# Patient Record
Sex: FEMALE | Race: WHITE | Employment: FULL TIME | URBAN - METROPOLITAN AREA
[De-identification: names, ages, dates, MRNs, and addresses within clinical notes are randomized per-mention and may not be internally consistent; named-entity substitution may affect disease eponyms.]

---

## 2017-06-13 ENCOUNTER — OFFICE VISIT (OUTPATIENT)
Dept: FAMILY MEDICINE CLINIC | Age: 47
End: 2017-06-13
Payer: COMMERCIAL

## 2017-06-13 VITALS
SYSTOLIC BLOOD PRESSURE: 130 MMHG | DIASTOLIC BLOOD PRESSURE: 80 MMHG | BODY MASS INDEX: 41.95 KG/M2 | HEIGHT: 70 IN | WEIGHT: 293 LBS

## 2017-06-13 DIAGNOSIS — B35.2 TINEA MANUUM: Primary | ICD-10-CM

## 2017-06-13 DIAGNOSIS — B35.3 TINEA PEDIS OF BOTH FEET: ICD-10-CM

## 2017-06-13 PROCEDURE — 99213 OFFICE O/P EST LOW 20 MIN: CPT | Performed by: FAMILY MEDICINE

## 2017-06-13 RX ORDER — TERBINAFINE HYDROCHLORIDE 250 MG/1
250 TABLET ORAL DAILY
Qty: 14 TABLET | Refills: 0 | Status: SHIPPED | OUTPATIENT
Start: 2017-06-13 | End: 2017-06-27

## 2017-06-13 ASSESSMENT — PATIENT HEALTH QUESTIONNAIRE - PHQ9
2. FEELING DOWN, DEPRESSED OR HOPELESS: 0
SUM OF ALL RESPONSES TO PHQ QUESTIONS 1-9: 0
SUM OF ALL RESPONSES TO PHQ9 QUESTIONS 1 & 2: 0
1. LITTLE INTEREST OR PLEASURE IN DOING THINGS: 0

## 2018-08-16 ENCOUNTER — OFFICE VISIT (OUTPATIENT)
Dept: FAMILY MEDICINE CLINIC | Age: 48
End: 2018-08-16
Payer: COMMERCIAL

## 2018-08-16 VITALS
HEART RATE: 71 BPM | HEIGHT: 70 IN | RESPIRATION RATE: 18 BRPM | DIASTOLIC BLOOD PRESSURE: 84 MMHG | BODY MASS INDEX: 41.95 KG/M2 | WEIGHT: 293 LBS | SYSTOLIC BLOOD PRESSURE: 130 MMHG | OXYGEN SATURATION: 97 %

## 2018-08-16 DIAGNOSIS — Z13.1 DIABETES MELLITUS SCREENING: ICD-10-CM

## 2018-08-16 DIAGNOSIS — B07.8 OTHER VIRAL WARTS: Primary | ICD-10-CM

## 2018-08-16 DIAGNOSIS — L40.9 PSORIASIS: ICD-10-CM

## 2018-08-16 DIAGNOSIS — E78.00 HYPERCHOLESTEROLEMIA: ICD-10-CM

## 2018-08-16 DIAGNOSIS — Z23 NEED FOR STREPTOCOCCUS PNEUMONIAE VACCINATION: ICD-10-CM

## 2018-08-16 PROCEDURE — 90732 PPSV23 VACC 2 YRS+ SUBQ/IM: CPT | Performed by: INTERNAL MEDICINE

## 2018-08-16 PROCEDURE — 90471 IMMUNIZATION ADMIN: CPT | Performed by: INTERNAL MEDICINE

## 2018-08-16 PROCEDURE — 99213 OFFICE O/P EST LOW 20 MIN: CPT | Performed by: INTERNAL MEDICINE

## 2018-08-16 RX ORDER — TRIAMCINOLONE ACETONIDE 1 MG/G
CREAM TOPICAL
Qty: 45 G | Refills: 0 | Status: SHIPPED | OUTPATIENT
Start: 2018-08-16

## 2018-08-16 ASSESSMENT — PATIENT HEALTH QUESTIONNAIRE - PHQ9
SUM OF ALL RESPONSES TO PHQ QUESTIONS 1-9: 0
1. LITTLE INTEREST OR PLEASURE IN DOING THINGS: 0
SUM OF ALL RESPONSES TO PHQ QUESTIONS 1-9: 0
2. FEELING DOWN, DEPRESSED OR HOPELESS: 0
SUM OF ALL RESPONSES TO PHQ9 QUESTIONS 1 & 2: 0

## 2018-08-16 ASSESSMENT — ENCOUNTER SYMPTOMS
COUGH: 0
SORE THROAT: 0
ABDOMINAL PAIN: 0
BLURRED VISION: 0
SHORTNESS OF BREATH: 0
HEARTBURN: 0
NAUSEA: 0

## 2018-08-16 NOTE — PROGRESS NOTES
After obtaining consent, and per orders of Dr. Zoila Soliz, injection of Pneumovax 23 given in Left deltoid by Kevin Peters. Patient instructed to remain in clinic for 20 minutes afterwards, and to report any adverse reaction to me immediately.

## 2018-08-16 NOTE — PROGRESS NOTES
HPI Notes    Name: Mariam Arnold  : 1970         Chief Complaint:     Chief Complaint   Patient presents with    Lesion(s)     on left hand and her knuckles on right5 hand       History of Present Illness:        Berenice Dugan presents to the office for evaluation of skin lesions on the right hand and forearm. She also complains of rash on both hands over the all knuckles. states the rash is itchy, her skin is peeling and sometimes bleeds   She tried to apply OTC cream for psoriasis. It didn't help. Reports history of psoriasis. States the lesions on the right hand and forearm do not resemble psoriasis rash. She believes it's most likely warts. The lesions are itchy, nonbleeding. She has no pain. She reports that she is getting new lesions            Past Medical History:     No past medical history on file. Reviewed all health maintenance requirements and ordered appropriate tests  Health Maintenance Due   Topic Date Due    HIV screen  1986    DTaP/Tdap/Td vaccine (1 - Tdap) 1990    Diabetes screen  2017    Cervical cancer screen  2017       Past Surgical History:     Past Surgical History:   Procedure Laterality Date    TUBAL LIGATION          Medications:       Prior to Admission medications    Medication Sig Start Date End Date Taking? Authorizing Provider   triamcinolone (KENALOG) 0.1 % cream Apply topically 2 times daily. 18  Yes Deepa Sen MD   acetaminophen (TYLENOL) 325 MG tablet Take 650 mg by mouth every 6 hours as needed. Yes Historical Provider, MD        Allergies: Other    Social History:     Tobacco:    reports that she quit smoking about 7 months ago. Her smoking use included Cigarettes. She has a 7.50 pack-year smoking history. She has never used smokeless tobacco.  Alcohol:      reports that she does not drink alcohol. Drug Use:  reports that she does not use drugs. Family History:     No family history on file.     Review of Systems:         Review of Systems   Constitutional: Negative for chills, fever and weight loss. HENT: Negative for congestion and sore throat. Eyes: Negative for blurred vision. Respiratory: Negative for cough and shortness of breath. Cardiovascular: Negative for chest pain and palpitations. Gastrointestinal: Negative for abdominal pain, heartburn and nausea. Genitourinary: Negative for dysuria. Skin: Positive for itching and rash. Neurological: Negative for dizziness and headaches. Psychiatric/Behavioral: Negative for depression and memory loss. The patient is not nervous/anxious. Physical Exam:     Vitals:  /84 (Site: Left Arm, Position: Sitting, Cuff Size: Large Adult)   Pulse 71   Resp 18   Ht 5' 10\" (1.778 m)   Wt (!) 348 lb (157.9 kg)   LMP 08/02/2018   SpO2 97%   BMI 49.93 kg/m²       Physical Exam   Constitutional: She is oriented to person, place, and time. She appears well-developed and well-nourished. No distress. HENT:   Head: Normocephalic and atraumatic. Right Ear: External ear normal.   Left Ear: External ear normal.   Mouth/Throat: Oropharynx is clear and moist. No oropharyngeal exudate. Eyes: Conjunctivae are normal. Right eye exhibits no discharge. Left eye exhibits no discharge. No scleral icterus. Neck: No thyromegaly present. Cardiovascular: Normal rate, regular rhythm, normal heart sounds and intact distal pulses. No murmur heard. Pulmonary/Chest: Effort normal and breath sounds normal. She has no wheezes. She has no rales. Abdominal: Soft. Bowel sounds are normal. She exhibits no distension and no mass. There is no tenderness. Musculoskeletal: Normal range of motion. She exhibits no edema or tenderness. Lymphadenopathy:     She has no cervical adenopathy. Neurological: She is alert and oriented to person, place, and time. No cranial nerve deficit. Coordination normal.   Skin: Skin is warm and dry.  Rash (overthe knuckles in both hands she has read, dry, scaly rash, no exudate) noted. There are 3 small wart-like lesions on the right hand and right forearm   Psychiatric: She has a normal mood and affect. Her behavior is normal. Judgment normal.   Vitals reviewed. Data:     Lab Results   Component Value Date     11/10/2014    K 4.1 11/10/2014     11/10/2014    CO2 26 11/10/2014    BUN 15 11/10/2014    CREATININE 1.01 11/10/2014    GLUCOSE 94 11/10/2014    LABALBU 3.9 01/08/2013    BILITOT 0.54 01/08/2013    ALKPHOS 66 01/08/2013    AST 18 01/08/2013    ALT 24 01/08/2013     Lab Results   Component Value Date    WBC 9.1 01/08/2013    RBC 4.72 01/08/2013    HGB 12.9 01/08/2013    HCT 38.1 01/08/2013    MCV 80.7 01/08/2013    MCH 27.3 01/08/2013    MCHC 33.8 01/08/2013    RDW 14.4 01/08/2013     01/08/2013    MPV NOT REPORTED 01/08/2013     Lab Results   Component Value Date    TSH 0.97 11/10/2014     Lab Results   Component Value Date    CHOL 242 05/16/2014    HDL 42 05/16/2014    LABA1C 5.4 05/16/2014          Assessment & Plan        Diagnosis Orders   1. Other viral warts   We'll refer to dermatology for evaluation of warts and psoriasis External Referral To Dermatology   2. Psoriasis   Ordered topical steroids. Follow-up with dermatology triamcinolone (KENALOG) 0.1 % cream    External Referral To Dermatology   3. Hypercholesterolemia  She has a history of elevated cholesterol in the past, not on any medications. We'll recheck lipid panel  Lipid Panel   4. Diabetes mellitus screening  Basic Metabolic Panel   5. Need for Streptococcus pneumoniae vaccination   She received Pneumovax in our office today Pneumococcal polysaccharide vaccine 23-valent greater than or equal to 1yo subcutaneous/IM                   Completed Refills   Requested Prescriptions     Signed Prescriptions Disp Refills    triamcinolone (KENALOG) 0.1 % cream 45 g 0     Sig: Apply topically 2 times daily.      Return in about 3 months

## 2018-09-10 ENCOUNTER — HOSPITAL ENCOUNTER (OUTPATIENT)
Age: 48
Discharge: HOME | End: 2018-09-10
Payer: COMMERCIAL

## 2018-09-10 DIAGNOSIS — Z13.1 DIABETES MELLITUS SCREENING: ICD-10-CM

## 2018-09-10 DIAGNOSIS — E78.00 HYPERCHOLESTEROLEMIA: ICD-10-CM

## 2018-09-10 LAB
ANION GAP SERPL CALCULATED.3IONS-SCNC: 10 MMOL/L (ref 9–17)
BUN BLDV-MCNC: 13 MG/DL (ref 6–20)
BUN/CREAT BLD: 16 (ref 9–20)
CALCIUM SERPL-MCNC: 9.3 MG/DL (ref 8.6–10.4)
CHLORIDE BLD-SCNC: 103 MMOL/L (ref 98–107)
CHOLESTEROL/HDL RATIO: 4.7
CHOLESTEROL: 198 MG/DL
CO2: 27 MMOL/L (ref 20–31)
CREAT SERPL-MCNC: 0.82 MG/DL (ref 0.5–0.9)
GFR AFRICAN AMERICAN: >60 ML/MIN
GFR NON-AFRICAN AMERICAN: >60 ML/MIN
GFR SERPL CREATININE-BSD FRML MDRD: NORMAL ML/MIN/{1.73_M2}
GFR SERPL CREATININE-BSD FRML MDRD: NORMAL ML/MIN/{1.73_M2}
GLUCOSE BLD-MCNC: 93 MG/DL (ref 70–99)
HDLC SERPL-MCNC: 42 MG/DL
LDL CHOLESTEROL: 122 MG/DL (ref 0–130)
PATIENT FASTING?: YES
POTASSIUM SERPL-SCNC: 4.2 MMOL/L (ref 3.7–5.3)
SODIUM BLD-SCNC: 140 MMOL/L (ref 135–144)
TRIGL SERPL-MCNC: 171 MG/DL
VLDLC SERPL CALC-MCNC: ABNORMAL MG/DL (ref 1–30)

## 2018-09-10 PROCEDURE — 80061 LIPID PANEL: CPT

## 2018-09-10 PROCEDURE — 80048 BASIC METABOLIC PNL TOTAL CA: CPT

## 2018-09-10 PROCEDURE — 36415 COLL VENOUS BLD VENIPUNCTURE: CPT

## 2020-01-03 ENCOUNTER — OFFICE VISIT (OUTPATIENT)
Dept: PRIMARY CARE CLINIC | Age: 50
End: 2020-01-03
Payer: COMMERCIAL

## 2020-01-03 VITALS
WEIGHT: 293 LBS | SYSTOLIC BLOOD PRESSURE: 130 MMHG | OXYGEN SATURATION: 96 % | DIASTOLIC BLOOD PRESSURE: 86 MMHG | BODY MASS INDEX: 48.64 KG/M2 | HEART RATE: 69 BPM | TEMPERATURE: 98 F

## 2020-01-03 PROCEDURE — 99213 OFFICE O/P EST LOW 20 MIN: CPT | Performed by: NURSE PRACTITIONER

## 2020-01-03 PROCEDURE — 87804 INFLUENZA ASSAY W/OPTIC: CPT | Performed by: NURSE PRACTITIONER

## 2020-01-03 RX ORDER — ALBUTEROL SULFATE 90 UG/1
2 AEROSOL, METERED RESPIRATORY (INHALATION) EVERY 4 HOURS PRN
Qty: 2 INHALER | Refills: 0 | Status: SHIPPED | OUTPATIENT
Start: 2020-01-03

## 2020-01-03 RX ORDER — PREDNISONE 20 MG/1
40 TABLET ORAL DAILY
Qty: 10 TABLET | Refills: 0 | Status: SHIPPED | OUTPATIENT
Start: 2020-01-03 | End: 2020-01-08

## 2020-01-03 RX ORDER — AMOXICILLIN 500 MG/1
500 CAPSULE ORAL 3 TIMES DAILY
Qty: 30 CAPSULE | Refills: 0 | Status: SHIPPED | OUTPATIENT
Start: 2020-01-03 | End: 2020-01-10

## 2020-01-03 ASSESSMENT — ENCOUNTER SYMPTOMS
SHORTNESS OF BREATH: 1
EYES NEGATIVE: 1
GASTROINTESTINAL NEGATIVE: 1
SORE THROAT: 1
WHEEZING: 1
COUGH: 1

## 2020-01-03 NOTE — PROGRESS NOTES
Respiratory: Positive for cough, shortness of breath and wheezing. Cardiovascular: Negative. Gastrointestinal: Negative. Genitourinary: Negative. Skin: Negative. Neurological: Negative. Objective:     Physical Exam  Constitutional:       General: She is not in acute distress. Appearance: She is obese. She is ill-appearing. She is not toxic-appearing or diaphoretic. HENT:      Head: Normocephalic. Comments: Right TM hyperemic with effusion      Left Ear: Tympanic membrane, ear canal and external ear normal.      Nose: Congestion present. Mouth/Throat:      Mouth: Mucous membranes are moist.      Pharynx: Posterior oropharyngeal erythema present. Eyes:      General:         Right eye: No discharge. Left eye: No discharge. Neck:      Musculoskeletal: Normal range of motion. Cardiovascular:      Rate and Rhythm: Normal rate and regular rhythm. Pulmonary:      Effort: Pulmonary effort is normal. No respiratory distress. Breath sounds: No stridor, decreased air movement or transmitted upper airway sounds. Examination of the right-upper field reveals wheezing. Examination of the left-upper field reveals wheezing. Examination of the right-middle field reveals wheezing. Examination of the left-middle field reveals wheezing. Wheezing present. No decreased breath sounds, rhonchi or rales. Comments: Occasional dry cough in office, speaks full sentences without distress   Skin:     General: Skin is warm. Capillary Refill: Capillary refill takes less than 2 seconds. Neurological:      Mental Status: She is alert. Psychiatric:         Behavior: Behavior is cooperative. /86 (Site: Right Upper Arm, Position: Sitting, Cuff Size: Large Adult)   Pulse 69   Temp 98 °F (36.7 °C) (Oral)   Wt (!) 339 lb (153.8 kg)   SpO2 96%   BMI 48.64 kg/m²     Assessment:      Diagnosis Orders   1.  Bronchitis  POCT Influenza A/B   2. Other non-recurrent acute nonsuppurative otitis media of right ear         Plan:   1. Bronchitis. Will treat with steroid burst.  Informed to not take NSAIDs with this medication. Informed to take steroids with food. Will give albuterol inhaler 2 puffs every 4-6 hours as needed for cough, wheeze. Discussed with patient if any severe symptoms such as chest pain, difficulty breathing or spiking fever go to the emergency room for evaluation. 2.  Otitis media. Will treat with amoxicillin. Discussed taking dose appropriate Tylenol as needed for discomfort. If symptoms persist or worsen return to clinic for evaluation. Patient son is positive for influenza B. Patient declines Tamiflu today in office. Discussed exam, POCT findings, plan of care (including prescriptive and supportive as listed below) and follow-up atlength with  Patient. Reviewed all prescribed and recommended medications, administration and side effects. Encouraged to return to [address] [place] [address] for noimprovement and or worsening of symptoms. To ER or call 911 if any difficulty breathing, shortness of breath, inability to swallow, hives or temp greater than 103 degrees. Questions answered. They verbalized understandingand agreement. No follow-ups on file. Orders Placed This Encounter   Medications    amoxicillin (AMOXIL) 500 MG capsule     Sig: Take 1 capsule by mouth 3 times daily for 7 days     Dispense:  30 capsule     Refill:  0    albuterol sulfate HFA (PROVENTIL HFA) 108 (90 Base) MCG/ACT inhaler     Sig: Inhale 2 puffs into the lungs every 4 hours as needed for Wheezing     Dispense:  2 Inhaler     Refill:  0    predniSONE (DELTASONE) 20 MG tablet     Sig: Take 2 tablets by mouth daily for 5 days     Dispense:  10 tablet     Refill:  0        She is asked to follow-up if symptoms persist or worsen. All patient questions answered. Pt voiced understanding.       Electronically signed by DEL Wills CNP on 1/3/2020 at 12:22 PM

## 2021-04-13 ENCOUNTER — TELEPHONE (OUTPATIENT)
Dept: PRIMARY CARE CLINIC | Age: 51
End: 2021-04-13

## 2021-04-13 DIAGNOSIS — Z20.822 SUSPECTED COVID-19 VIRUS INFECTION: Primary | ICD-10-CM

## 2023-05-25 ENCOUNTER — OFFICE VISIT (OUTPATIENT)
Dept: PRIMARY CARE CLINIC | Age: 53
End: 2023-05-25
Payer: COMMERCIAL

## 2023-05-25 VITALS
DIASTOLIC BLOOD PRESSURE: 88 MMHG | OXYGEN SATURATION: 95 % | WEIGHT: 293 LBS | BODY MASS INDEX: 41.95 KG/M2 | HEIGHT: 70 IN | SYSTOLIC BLOOD PRESSURE: 122 MMHG | HEART RATE: 69 BPM

## 2023-05-25 DIAGNOSIS — Z13.0 SCREENING, ANEMIA, DEFICIENCY, IRON: ICD-10-CM

## 2023-05-25 DIAGNOSIS — Z72.0 VAPES NICOTINE CONTAINING SUBSTANCE: ICD-10-CM

## 2023-05-25 DIAGNOSIS — Z13.1 SCREENING FOR DIABETES MELLITUS: ICD-10-CM

## 2023-05-25 DIAGNOSIS — Z13.220 SCREENING, LIPID: ICD-10-CM

## 2023-05-25 DIAGNOSIS — Z13.29 SCREENING FOR THYROID DISORDER: ICD-10-CM

## 2023-05-25 DIAGNOSIS — J01.00 ACUTE MAXILLARY SINUSITIS, RECURRENCE NOT SPECIFIED: Primary | ICD-10-CM

## 2023-05-25 DIAGNOSIS — E55.9 VITAMIN D DEFICIENCY: ICD-10-CM

## 2023-05-25 DIAGNOSIS — E66.01 MORBID OBESITY WITH BMI OF 45.0-49.9, ADULT (HCC): ICD-10-CM

## 2023-05-25 DIAGNOSIS — Z12.31 SCREENING MAMMOGRAM, ENCOUNTER FOR: ICD-10-CM

## 2023-05-25 DIAGNOSIS — Z12.11 COLON CANCER SCREENING: ICD-10-CM

## 2023-05-25 PROCEDURE — 99203 OFFICE O/P NEW LOW 30 MIN: CPT | Performed by: NURSE PRACTITIONER

## 2023-05-25 RX ORDER — AMOXICILLIN AND CLAVULANATE POTASSIUM 875; 125 MG/1; MG/1
1 TABLET, FILM COATED ORAL 2 TIMES DAILY
Qty: 20 TABLET | Refills: 0 | Status: SHIPPED | OUTPATIENT
Start: 2023-05-25 | End: 2023-06-04

## 2023-05-25 RX ORDER — MONTELUKAST SODIUM 10 MG/1
10 TABLET ORAL DAILY
Qty: 30 TABLET | Refills: 0 | Status: SHIPPED | OUTPATIENT
Start: 2023-05-25

## 2023-05-25 SDOH — ECONOMIC STABILITY: HOUSING INSECURITY
IN THE LAST 12 MONTHS, WAS THERE A TIME WHEN YOU DID NOT HAVE A STEADY PLACE TO SLEEP OR SLEPT IN A SHELTER (INCLUDING NOW)?: NO

## 2023-05-25 SDOH — ECONOMIC STABILITY: INCOME INSECURITY: HOW HARD IS IT FOR YOU TO PAY FOR THE VERY BASICS LIKE FOOD, HOUSING, MEDICAL CARE, AND HEATING?: NOT HARD AT ALL

## 2023-05-25 SDOH — ECONOMIC STABILITY: FOOD INSECURITY: WITHIN THE PAST 12 MONTHS, THE FOOD YOU BOUGHT JUST DIDN'T LAST AND YOU DIDN'T HAVE MONEY TO GET MORE.: NEVER TRUE

## 2023-05-25 SDOH — ECONOMIC STABILITY: FOOD INSECURITY: WITHIN THE PAST 12 MONTHS, YOU WORRIED THAT YOUR FOOD WOULD RUN OUT BEFORE YOU GOT MONEY TO BUY MORE.: NEVER TRUE

## 2023-05-25 ASSESSMENT — PATIENT HEALTH QUESTIONNAIRE - PHQ9
SUM OF ALL RESPONSES TO PHQ QUESTIONS 1-9: 0
SUM OF ALL RESPONSES TO PHQ9 QUESTIONS 1 & 2: 0
1. LITTLE INTEREST OR PLEASURE IN DOING THINGS: 0
SUM OF ALL RESPONSES TO PHQ QUESTIONS 1-9: 0
2. FEELING DOWN, DEPRESSED OR HOPELESS: 0
SUM OF ALL RESPONSES TO PHQ QUESTIONS 1-9: 0
SUM OF ALL RESPONSES TO PHQ QUESTIONS 1-9: 0

## 2023-05-29 ASSESSMENT — ENCOUNTER SYMPTOMS
COUGH: 0
SINUS PRESSURE: 1
EYES NEGATIVE: 1
SINUS PAIN: 1
CHEST TIGHTNESS: 0
DIARRHEA: 0
SORE THROAT: 0
RHINORRHEA: 1
SHORTNESS OF BREATH: 0
WHEEZING: 0

## 2023-05-30 ENCOUNTER — TELEPHONE (OUTPATIENT)
Dept: PHARMACY | Facility: CLINIC | Age: 53
End: 2023-05-30

## 2023-05-30 NOTE — TELEPHONE ENCOUNTER
Scheduling Instructions    Pt unsure how much nicotine she is using I took picture of her product trying to discuss cessation. Can you calculate and current use and cessation options please. thanks               Received a referral:  from Provider to review patients medications. Called patient to schedule a time to speak with a pharmacist over the telephone. No answer left VM: Please contact us at  [phone] to schedule this appointment.  Pharmacists are available Monday thru Friday 7:30 AM till 5:30 PM.      Mireya Oliveros[address] free: [phone]

## 2023-05-31 NOTE — TELEPHONE ENCOUNTER
Patient called back and scheduled referral appointment for 6/2/23 at [facility], [address]   Phone: [phone]       For Pharmacy Admin Tracking Only    Program: [address] in place:  No  Recommendation Provided To: Patient/Caregiver: 1 via Telephone  Intervention Detail: Scheduled Appointment  Intervention Accepted By: Patient/Caregiver: 1  Gap Closed?: Yes   Time Spent (min): 10

## 2023-06-01 ENCOUNTER — TELEPHONE (OUTPATIENT)
Dept: PRIMARY CARE CLINIC | Age: 53
End: 2023-06-01

## 2023-06-05 NOTE — TELEPHONE ENCOUNTER
No  Recommendation Provided To: Provider: 1 via Note to Provider  Intervention Detail: New Rx: 1, reason: Patient Preference  Intervention Accepted By: Provider: 1  Gap Closed?: Yes   Time Spent (min):  90

## 2023-06-07 ENCOUNTER — HOSPITAL ENCOUNTER (OUTPATIENT)
Dept: MAMMOGRAPHY | Age: 53
Discharge: HOME | End: 2023-06-09
Payer: COMMERCIAL

## 2023-06-07 ENCOUNTER — HOSPITAL ENCOUNTER (OUTPATIENT)
Age: 53
Discharge: HOME | End: 2023-06-07
Payer: COMMERCIAL

## 2023-06-07 ENCOUNTER — OFFICE VISIT (OUTPATIENT)
Dept: GASTROENTEROLOGY | Age: 53
End: 2023-06-07
Payer: COMMERCIAL

## 2023-06-07 VITALS
HEART RATE: 88 BPM | RESPIRATION RATE: 18 BRPM | OXYGEN SATURATION: 95 % | DIASTOLIC BLOOD PRESSURE: 84 MMHG | WEIGHT: 293 LBS | HEIGHT: 70 IN | SYSTOLIC BLOOD PRESSURE: 118 MMHG | BODY MASS INDEX: 41.95 KG/M2

## 2023-06-07 DIAGNOSIS — Z13.220 SCREENING, LIPID: ICD-10-CM

## 2023-06-07 DIAGNOSIS — Z80.0 FAMILY HISTORY OF COLON CANCER: ICD-10-CM

## 2023-06-07 DIAGNOSIS — Z12.11 COLON CANCER SCREENING: Primary | ICD-10-CM

## 2023-06-07 DIAGNOSIS — Z12.31 SCREENING MAMMOGRAM, ENCOUNTER FOR: ICD-10-CM

## 2023-06-07 DIAGNOSIS — E55.9 VITAMIN D DEFICIENCY: ICD-10-CM

## 2023-06-07 DIAGNOSIS — Z13.29 SCREENING FOR THYROID DISORDER: ICD-10-CM

## 2023-06-07 DIAGNOSIS — Z13.0 SCREENING, ANEMIA, DEFICIENCY, IRON: ICD-10-CM

## 2023-06-07 LAB
25(OH)D3 SERPL-MCNC: 13.4 NG/ML
ALBUMIN SERPL-MCNC: 4.2 G/DL (ref 3.5–5.2)
ALP SERPL-CCNC: 81 U/L (ref 35–104)
ALT SERPL-CCNC: 31 U/L (ref 5–33)
ANION GAP SERPL CALCULATED.3IONS-SCNC: 10 MMOL/L (ref 9–17)
AST SERPL-CCNC: 19 U/L
BASOPHILS # BLD: 0.1 K/UL (ref 0–0.2)
BASOPHILS NFR BLD: 1 % (ref 0–2)
BILIRUB SERPL-MCNC: 0.6 MG/DL (ref 0.3–1.2)
BUN SERPL-MCNC: 14 MG/DL (ref 6–20)
BUN/CREAT SERPL: 17 (ref 9–20)
CALCIUM SERPL-MCNC: 9.7 MG/DL (ref 8.6–10.4)
CHLORIDE SERPL-SCNC: 104 MMOL/L (ref 98–107)
CHOLEST SERPL-MCNC: 220 MG/DL
CHOLESTEROL/HDL RATIO: 5
CO2 SERPL-SCNC: 25 MMOL/L (ref 20–31)
CREAT SERPL-MCNC: 0.83 MG/DL (ref 0.5–0.9)
DIFFERENTIAL TYPE: YES
EOSINOPHIL # BLD: 0.1 K/UL (ref 0–0.4)
EOSINOPHILS RELATIVE PERCENT: 1 % (ref 0–5)
ERYTHROCYTE [DISTWIDTH] IN BLOOD BY AUTOMATED COUNT: 13.8 % (ref 12.1–15.2)
GFR SERPL CREATININE-BSD FRML MDRD: >60 ML/MIN/1.73M2
GLUCOSE SERPL-MCNC: 101 MG/DL (ref 70–99)
HCT VFR BLD AUTO: 42.5 % (ref 36–46)
HDLC SERPL-MCNC: 44 MG/DL
HGB BLD-MCNC: 14 G/DL (ref 12–16)
LDLC SERPL CALC-MCNC: 153 MG/DL (ref 0–130)
LYMPHOCYTES # BLD: 44 % (ref 15–40)
LYMPHOCYTES NFR BLD: 3.7 K/UL (ref 1–4.8)
MCH RBC QN AUTO: 27.1 PG (ref 26–34)
MCHC RBC AUTO-ENTMCNC: 33 G/DL (ref 31–37)
MCV RBC AUTO: 82.3 FL (ref 80–100)
MONOCYTES NFR BLD: 0.5 K/UL (ref 0–1)
MONOCYTES NFR BLD: 6 % (ref 4–8)
NEUTROPHILS NFR BLD: 48 % (ref 47–75)
NEUTS SEG NFR BLD: 4 K/UL (ref 2.5–7)
PLATELET # BLD AUTO: 266 K/UL (ref 140–450)
POTASSIUM SERPL-SCNC: 4.5 MMOL/L (ref 3.7–5.3)
PROT SERPL-MCNC: 7.3 G/DL (ref 6.4–8.3)
RBC # BLD AUTO: 5.16 M/UL (ref 4–5.2)
SODIUM SERPL-SCNC: 139 MMOL/L (ref 135–144)
TRIGL SERPL-MCNC: 116 MG/DL
TSH SERPL-MCNC: 0.59 UIU/ML (ref 0.3–5)
WBC OTHER # BLD: 8.4 K/UL (ref 3.5–11)

## 2023-06-07 PROCEDURE — 82306 VITAMIN D 25 HYDROXY: CPT

## 2023-06-07 PROCEDURE — 80053 COMPREHEN METABOLIC PANEL: CPT

## 2023-06-07 PROCEDURE — S0285 CNSLT BEFORE SCREEN COLONOSC: HCPCS | Performed by: NURSE PRACTITIONER

## 2023-06-07 PROCEDURE — 85027 COMPLETE CBC AUTOMATED: CPT

## 2023-06-07 PROCEDURE — 77063 BREAST TOMOSYNTHESIS BI: CPT

## 2023-06-07 PROCEDURE — 36415 COLL VENOUS BLD VENIPUNCTURE: CPT

## 2023-06-07 PROCEDURE — 84443 ASSAY THYROID STIM HORMONE: CPT

## 2023-06-07 PROCEDURE — 80061 LIPID PANEL: CPT

## 2023-06-07 RX ORDER — POLYETHYLENE GLYCOL 3350, SODIUM CHLORIDE, SODIUM BICARBONATE, POTASSIUM CHLORIDE 420; 11.2; 5.72; 1.48 G/4L; G/4L; G/4L; G/4L
4000 POWDER, FOR SOLUTION ORAL ONCE
Qty: 4000 ML | Refills: 0 | Status: SHIPPED | OUTPATIENT
Start: 2023-06-07 | End: 2023-06-07

## 2023-06-07 ASSESSMENT — ENCOUNTER SYMPTOMS
CONSTIPATION: 1
ALLERGIC/IMMUNOLOGIC NEGATIVE: 1
RESPIRATORY NEGATIVE: 1

## 2023-06-07 NOTE — PROGRESS NOTES
speech normal.      Lab Results   Component Value Date    WBC 8.4 06/07/2023    HGB 14.0 06/07/2023    HCT 42.5 06/07/2023    MCV 82.3 06/07/2023     06/07/2023        Lab Results   Component Value Date     06/07/2023    K 4.5 06/07/2023     06/07/2023    CO2 25 06/07/2023    BUN 14 06/07/2023    CREATININE 0.83 06/07/2023    GLUCOSE 101 (H) 06/07/2023    CALCIUM 9.7 06/07/2023    PROT 7.3 06/07/2023    LABALBU 4.2 06/07/2023    BILITOT 0.6 06/07/2023    ALKPHOS 81 06/07/2023    AST 19 06/07/2023    ALT 31 06/07/2023    LABGLOM >60 06/07/2023    GFRAA >60 09/10/2018          Lab Results   Component Value Date    INR 1.0 01/07/2013    PROTIME 10.2 01/07/2013       CT Result (most recent):  No results found for this or any previous visit from the past 3650 days. Eneida Dietz is a 47 y.o. old female who has a past medical history of IBS, psoriasis and hypercholesteremia presenting for her initial colon cancer screening colonoscopy. ASA: 3  Mallampati Score: 2    Plan    1. Colon cancer screening  - COLONOSCOPY (Screening); Future  - polyethylene glycol-electrolytes (NULYTELY WITH FLAVOR PACKS) 420 g solution; Take 4,000 mLs by mouth once for 1 dose  Dispense: 4000 mL; Refill: 0    2. Additional recommendations based on findings. Informed consent was obtained with a discussion about potential risks and complications of the procedure. Patient verbalized understanding and willingness to continue with the procedure scheduling. Spent 20 minutes with the patient with greater than 50 percent of the time was spent on face-to-face time in discussion with the patient regarding diagnostic options/results, treatment options, counseling, and follow-up plan. Kelli Kay, APRN - CNP    *This report was transcribed using voice recognition software. Every effort was made to ensure accuracy; however, inadvertent computerized transcription errors may be present.

## 2023-09-14 DIAGNOSIS — J01.00 ACUTE MAXILLARY SINUSITIS, RECURRENCE NOT SPECIFIED: ICD-10-CM

## 2023-09-14 RX ORDER — MONTELUKAST SODIUM 10 MG/1
10 TABLET ORAL DAILY
Qty: 30 TABLET | Refills: 1 | Status: SHIPPED | OUTPATIENT
Start: 2023-09-14

## 2023-11-10 ENCOUNTER — HOSPITAL ENCOUNTER (OUTPATIENT)
Age: 53
Discharge: HOME | End: 2023-11-10

## 2023-11-10 DIAGNOSIS — Z01.818 PRE-OP TESTING: ICD-10-CM

## 2023-11-12 LAB
EKG ATRIAL RATE: 60 BPM
EKG P AXIS: -26 DEGREES
EKG P-R INTERVAL: 168 MS
EKG Q-T INTERVAL: 402 MS
EKG QRS DURATION: 98 MS
EKG QTC CALCULATION (BAZETT): 402 MS
EKG R AXIS: 65 DEGREES
EKG T AXIS: 57 DEGREES
EKG VENTRICULAR RATE: 60 BPM

## 2023-11-15 ENCOUNTER — TELEPHONE (OUTPATIENT)
Dept: GASTROENTEROLOGY | Age: 53
End: 2023-11-15

## 2023-11-15 NOTE — TELEPHONE ENCOUNTER
[facility] [address] [place], [place]    Phone *[phone]   Surgical Scheduling Direct Line Phone *[phone] Fax *[phone]      Aysha Granger 1970 female         Home Phone: [phone]      Cell Phone:    Telephone Information:   Mobile [phone]          Surgeon: SABI                   Surgery Date: 4/9/24             Time:     Procedure: Colonoscopy    Diagnosis: colon cancer screening, family history of colon CA Z12.11, Z80.0     Important Medical History:  In Epic    Special Inst/Equip: Regular    CPT Codes:    02412    Latex Allergy: No       Cardiac Device:  No    Anesthesia:  MAC          Admission Type:  Same Day                          Admit Prior to Day of Surgery: No              Preadmission Testing:  Phone Call          PAT Date and Time:     Need Preop Cardiac Clearance: No    Does Patient have Cardiologist/physician?   N/a    :                            Date:     [employer] Name: Linda Leiva

## 2023-11-15 NOTE — TELEPHONE ENCOUNTER
Patient cancelling procedure for 11/16/23. Exposed to covid multiple times and her ride for her procedure is Covid +. Please see scheduling form for new date.

## 2024-01-22 ENCOUNTER — HOSPITAL ENCOUNTER (EMERGENCY)
Age: 54
Discharge: HOME | End: 2024-01-22
Attending: EMERGENCY MEDICINE
Payer: COMMERCIAL

## 2024-01-22 VITALS
RESPIRATION RATE: 18 BRPM | HEIGHT: 72 IN | BODY MASS INDEX: 39.68 KG/M2 | SYSTOLIC BLOOD PRESSURE: 155 MMHG | OXYGEN SATURATION: 97 % | HEART RATE: 72 BPM | DIASTOLIC BLOOD PRESSURE: 103 MMHG | WEIGHT: 293 LBS | TEMPERATURE: 97.7 F

## 2024-01-22 DIAGNOSIS — M54.12 LEFT CERVICAL RADICULOPATHY: ICD-10-CM

## 2024-01-22 DIAGNOSIS — M54.2 NECK PAIN: Primary | ICD-10-CM

## 2024-01-22 PROCEDURE — 6370000000 HC RX 637 (ALT 250 FOR IP): Performed by: EMERGENCY MEDICINE

## 2024-01-22 PROCEDURE — 99284 EMERGENCY DEPT VISIT MOD MDM: CPT

## 2024-01-22 PROCEDURE — 96372 THER/PROPH/DIAG INJ SC/IM: CPT

## 2024-01-22 PROCEDURE — 6360000002 HC RX W HCPCS: Performed by: EMERGENCY MEDICINE

## 2024-01-22 RX ORDER — PREDNISONE 10 MG/1
TABLET ORAL
Qty: 20 TABLET | Refills: 0 | Status: SHIPPED | OUTPATIENT
Start: 2024-01-22 | End: 2024-02-01

## 2024-01-22 RX ORDER — ONDANSETRON 4 MG/1
4 TABLET, ORALLY DISINTEGRATING ORAL ONCE
Status: COMPLETED | OUTPATIENT
Start: 2024-01-22 | End: 2024-01-22

## 2024-01-22 RX ORDER — OXYCODONE HYDROCHLORIDE AND ACETAMINOPHEN 5; 325 MG/1; MG/1
1 TABLET ORAL EVERY 6 HOURS PRN
Qty: 12 TABLET | Refills: 0 | Status: SHIPPED | OUTPATIENT
Start: 2024-01-22 | End: 2024-01-25

## 2024-01-22 RX ORDER — ONDANSETRON 4 MG/1
4 TABLET, FILM COATED ORAL EVERY 8 HOURS PRN
Qty: 20 TABLET | Refills: 0 | Status: SHIPPED | OUTPATIENT
Start: 2024-01-22

## 2024-01-22 RX ADMIN — HYDROMORPHONE HYDROCHLORIDE 1 MG: 1 INJECTION, SOLUTION INTRAMUSCULAR; INTRAVENOUS; SUBCUTANEOUS at 12:19

## 2024-01-22 RX ADMIN — ONDANSETRON 4 MG: 4 TABLET, ORALLY DISINTEGRATING ORAL at 12:19

## 2024-01-22 ASSESSMENT — PAIN DESCRIPTION - FREQUENCY
FREQUENCY: INTERMITTENT
FREQUENCY: INTERMITTENT

## 2024-01-22 ASSESSMENT — ENCOUNTER SYMPTOMS
WHEEZING: 0
VOMITING: 0
TROUBLE SWALLOWING: 0
DIARRHEA: 0
CHEST TIGHTNESS: 0
SORE THROAT: 0
SHORTNESS OF BREATH: 0
EYE PAIN: 0
RHINORRHEA: 0
ABDOMINAL PAIN: 0
BACK PAIN: 0

## 2024-01-22 ASSESSMENT — PAIN DESCRIPTION - PAIN TYPE
TYPE: ACUTE PAIN
TYPE: ACUTE PAIN

## 2024-01-22 ASSESSMENT — PAIN DESCRIPTION - ORIENTATION
ORIENTATION: LEFT
ORIENTATION: LEFT

## 2024-01-22 ASSESSMENT — PAIN DESCRIPTION - DESCRIPTORS
DESCRIPTORS: BURNING
DESCRIPTORS: BURNING;SHOOTING

## 2024-01-22 ASSESSMENT — PAIN DESCRIPTION - LOCATION
LOCATION: ARM
LOCATION: ARM

## 2024-01-22 ASSESSMENT — LIFESTYLE VARIABLES
HOW OFTEN DO YOU HAVE A DRINK CONTAINING ALCOHOL: NEVER
HOW MANY STANDARD DRINKS CONTAINING ALCOHOL DO YOU HAVE ON A TYPICAL DAY: PATIENT DOES NOT DRINK

## 2024-01-22 ASSESSMENT — PAIN - FUNCTIONAL ASSESSMENT: PAIN_FUNCTIONAL_ASSESSMENT: 0-10

## 2024-01-22 ASSESSMENT — PAIN SCALES - GENERAL
PAINLEVEL_OUTOF10: 8
PAINLEVEL_OUTOF10: 5

## 2024-01-22 ASSESSMENT — PAIN DESCRIPTION - ONSET: ONSET: ON-GOING

## 2024-01-22 NOTE — ED NOTES
Patient has called son and daughter to come pick her and her vehicle up. Patient educated not to drive while taking pain medication.

## 2024-01-22 NOTE — ED PROVIDER NOTES
that x-rays will not provide us a lot of information in this situation and will be important to follow-up with her PCP to be approved for MRI imaging.  In the meantime, we will focus on pain control.  Her OARRS is negative.  She has been using ibuprofen and Lidoderm patch, I have given her IM injection of Dilaudid here as morphine makes her vomit, I have given her prescription for both prednisone and Percocet at home.  Risk versus benefits of steroids discussed.  She will follow-up with her PCP, we discussed that she may also benefit from physical therapy, she will return if any worsening or changing symptoms.    REVAL:         CRITICAL CARE TIME   Total Critical Care time was 0 minutes, excluding separatelyreportable procedures.  There was a high probability ofclinically significant/life threatening deterioration in the patient's condition which required my urgent intervention.     CONSULTS:  [unfilled]    PROCEDURES:  Unless otherwise noted below, none     Procedures    FINAL IMPRESSION      1. Neck pain    2. Left cervical radiculopathy          DISPOSITION/PLAN   DISPOSITION Decision To Discharge 01/22/2024 12:16:06 PM      PATIENT REFERRED TO:  Hafsa Simons, APRN - CNP  [address] [address]  [phone]    In 2 days        DISCHARGE MEDICATIONS:  New Prescriptions    ONDANSETRON (ZOFRAN) 4 MG TABLET    Take 1 tablet by mouth every 8 hours as needed for Nausea    OXYCODONE-ACETAMINOPHEN (PERCOCET) 5-325 MG PER TABLET    Take 1 tablet by mouth every 6 hours as needed for Pain for up to 3 days. WARNING:  May cause drowsiness.  May impair ability to operate vehicles or machinery.  Do not use in combination with alcohol. Max Daily Amount: 4 tablets    PREDNISONE (DELTASONE) 10 MG TABLET    Take 4 tablets by mouth once daily for 5 days              Summation      Patient Course: Cervical radiculopathy with no neurologic deficit and no red flags to warrant emergent MRI, will try course of

## 2024-01-23 ENCOUNTER — TELEPHONE (OUTPATIENT)
Dept: PRIMARY CARE CLINIC | Age: 54
End: 2024-01-23

## 2024-01-23 ENCOUNTER — OFFICE VISIT (OUTPATIENT)
Dept: PRIMARY CARE CLINIC | Age: 54
End: 2024-01-23
Payer: COMMERCIAL

## 2024-01-23 VITALS
HEART RATE: 66 BPM | DIASTOLIC BLOOD PRESSURE: 88 MMHG | HEIGHT: 72 IN | OXYGEN SATURATION: 96 % | WEIGHT: 293 LBS | SYSTOLIC BLOOD PRESSURE: 138 MMHG | BODY MASS INDEX: 39.68 KG/M2

## 2024-01-23 DIAGNOSIS — M62.838 MUSCLE SPASMS OF NECK: ICD-10-CM

## 2024-01-23 DIAGNOSIS — M50.123 CERVICAL DISC DISORDER AT C6-C7 LEVEL WITH RADICULOPATHY: Primary | ICD-10-CM

## 2024-01-23 DIAGNOSIS — M54.2 NECK PAIN, ACUTE: ICD-10-CM

## 2024-01-23 PROCEDURE — 99213 OFFICE O/P EST LOW 20 MIN: CPT | Performed by: NURSE PRACTITIONER

## 2024-01-23 RX ORDER — PREGABALIN 25 MG/1
25 CAPSULE ORAL 2 TIMES DAILY PRN
Qty: 28 CAPSULE | Refills: 0 | Status: SHIPPED | OUTPATIENT
Start: 2024-01-23 | End: 2024-02-06

## 2024-01-23 ASSESSMENT — ENCOUNTER SYMPTOMS
SHORTNESS OF BREATH: 0
VOMITING: 1
NAUSEA: 1
BACK PAIN: 0
CHEST TIGHTNESS: 0
EYES NEGATIVE: 1

## 2024-01-23 ASSESSMENT — PATIENT HEALTH QUESTIONNAIRE - PHQ9
SUM OF ALL RESPONSES TO PHQ QUESTIONS 1-9: 0
1. LITTLE INTEREST OR PLEASURE IN DOING THINGS: 0
SUM OF ALL RESPONSES TO PHQ QUESTIONS 1-9: 0
2. FEELING DOWN, DEPRESSED OR HOPELESS: 0
SUM OF ALL RESPONSES TO PHQ QUESTIONS 1-9: 0
SUM OF ALL RESPONSES TO PHQ9 QUESTIONS 1 & 2: 0
SUM OF ALL RESPONSES TO PHQ QUESTIONS 1-9: 0

## 2024-01-23 NOTE — TELEPHONE ENCOUNTER
[facility] ED Follow up Call    Reason for ED visit:  Neck pain     1/23/2024    Rogelio Rivers , this is Sunita Brenner LPN from Hafsa Simons's DEL-CNP office, just calling to see how you are doing after your recent ED visit.    Did you receive discharge instructions?  Yes  Do you understand the discharge instructions? Yes  Did the ED give you any new prescriptions? Yes  Were you able to fill your prescriptions? Yes      Do you have one of our red, yellow and green  Zone sheets that help you to determine when you should go to the ED?  No      Do you need or want to make a follow up appt with your PCP?  Yes, scheduled today at 140    Do you have any further needs in the home, e.g. equipment?  No        Future Appointments   Date Time Provider Department Center   1/23/2024  1:40 PM Hafsa Simons, APRN - CNP  pc [facility]

## 2024-01-27 ASSESSMENT — ENCOUNTER SYMPTOMS
SORE THROAT: 0
COUGH: 0
ABDOMINAL DISTENTION: 0
EYES NEGATIVE: 1
CHEST TIGHTNESS: 0
SHORTNESS OF BREATH: 0
WHEEZING: 0

## 2024-01-27 NOTE — PROGRESS NOTES
Tita Paniagua (:  1970) is a 53 y.o. female,Established patient, here for evaluation of the following chief complaint(s):  Neck Pain (ED f/u. Pt has a pinched nerve on left side of neck, and pain radiates down left arm. Has tingling that runs all the way down to her fingertips. Has been to chiropractor once to help with this issue, but with no improvement.  Currently rates pain 5/10, constant. )             Subjective   SUBJECTIVE/OBJECTIVE:  Neck Pain   Associated symptoms include numbness (ULE) and weakness (ULE). Pertinent negatives include no chest pain, fever or headaches.     Pt presents with symptoms of a pinch nerve of the left side of the neck that radiates down to the left arm and fingers. Symptoms began spontaneously on 1/3/24, but pain intensified on 24, which led to an ED visit on 24. The pain is rated as 8/10 last night, but today is 5/10. She described as a shooting pain to the fingers, with subsequent muscle spasms of the left arm.  The ED provider gave her an analgesic (either Dilaudid or Morphine), but she vomited when she got home from the ED. She also has taken 4 ibuprofen today and 2 prednisone. She also has been using lidocaine patches on her left arm and left neck, with initial relief, but subsequent return of symptoms. She has some relief when she sleeps with her left arm elevated above her head while sleeping.     She had numbness and tingling of the first 3 fingers of the left hand. Today the numbness and tingling is above the DIP of the thumb and index finger of the left hand.   She has tried relief from the chiropractor, but with no relief.   Patient is right handed dominant.   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       Natural menopause, 2023 LMP .   5'10 3/4 \" from 6' as adult     Review of Systems   Constitutional:  Negative for fever.   HENT:  Negative for ear pain.    Eyes: Negative.    Respiratory:  Negative for chest tightness and shortness 
soft.      Tenderness: There is no abdominal tenderness.   Musculoskeletal:         General: Tenderness present. Normal range of motion.      Cervical back: Normal range of motion and neck supple. Tenderness (left paraspinal cervical TPT) present.      Right lower leg: No edema.      Left lower leg: No edema.   Lymphadenopathy:      Cervical: No cervical adenopathy.   Skin:     General: Skin is warm and dry.   Neurological:      Mental Status: She is alert and oriented to person, place, and time.      Deep Tendon Reflexes: Reflexes abnormal (left tricep absent, bicep 1+ , brachioradialis 1+ , right bicep and tricep 1+).   Psychiatric:         Behavior: Behavior normal.         Thought Content: Thought content normal.         Judgment: Judgment normal.             Data:     Lab Results   Component Value Date/Time     06/07/2023 12:37 PM    K 4.5 06/07/2023 12:37 PM     06/07/2023 12:37 PM    CO2 25 06/07/2023 12:37 PM    BUN 14 06/07/2023 12:37 PM    CREATININE 0.83 06/07/2023 12:37 PM    GLUCOSE 101 06/07/2023 12:37 PM    PROT 7.3 06/07/2023 12:37 PM    LABALBU 4.2 06/07/2023 12:37 PM    BILITOT 0.6 06/07/2023 12:37 PM    ALKPHOS 81 06/07/2023 12:37 PM    AST 19 06/07/2023 12:37 PM    ALT 31 06/07/2023 12:37 PM     Lab Results   Component Value Date/Time    WBC 8.4 06/07/2023 12:37 PM    RBC 5.16 06/07/2023 12:37 PM    HGB 14.0 06/07/2023 12:37 PM    HCT 42.5 06/07/2023 12:37 PM    MCV 82.3 06/07/2023 12:37 PM    MCH 27.1 06/07/2023 12:37 PM    MCHC 33.0 06/07/2023 12:37 PM    RDW 13.8 06/07/2023 12:37 PM     06/07/2023 12:37 PM    MPV NOT REPORTED 01/08/2013 05:20 AM     Lab Results   Component Value Date/Time    TSH 0.59 06/07/2023 12:37 PM     Lab Results   Component Value Date/Time    CHOL 220 06/07/2023 12:37 PM    CHOL 242 05/16/2014 12:00 AM    HDL 44 06/07/2023 12:37 PM    LABA1C 5.4 05/16/2014 12:00 AM          Assessment & Plan        Diagnosis Orders   1. Cervical disc disorder at C6-C7

## 2024-01-30 ENCOUNTER — HOSPITAL ENCOUNTER (OUTPATIENT)
Dept: PHYSICAL THERAPY | Age: 54
Setting detail: THERAPIES SERIES
Discharge: HOME | End: 2024-01-30
Payer: COMMERCIAL

## 2024-01-30 PROCEDURE — 97161 PT EVAL LOW COMPLEX 20 MIN: CPT

## 2024-01-30 PROCEDURE — 97110 THERAPEUTIC EXERCISES: CPT

## 2024-01-30 ASSESSMENT — PAIN SCALES - GENERAL: PAINLEVEL_OUTOF10: 5

## 2024-01-30 NOTE — PLAN OF CARE
[facility]       Phone: [phone]   Date: 2024                      Outpatient Physical Therapy  Fax: [phone]    ACCT #: [id number]                     Plan of Care  [facility]#: [id number]  Patient: Tita Mary  : 1970    Referring Provider (secondary): DEL Donnelly-CNP    Diagnosis: Cervical Disc Disorder at C6-C7 level with radiculopathy  Onset Date: 24  Treatment Diagnosis: Neck pain , L UE numbness/pain    Assessment  Body Structures, Functions, Activity Limitations Requiring Skilled Therapeutic Intervention: Decreased functional mobility , Decreased ADL status, Decreased ROM, Decreased strength, Decreased endurance, Decreased posture, Increased pain  Assessment: Pt to benefit from ther ex for scapular strengthening and ROM.  Pt to also benefit from cervical traction for decompression of Cspine. Pending response to traction may transition to manual therapy for cervical and 1st rib mobility.  Therapy Prognosis: Good    Treatment Plan   Days: 2 times per week Weeks: 6 weeks Total # of Visits Approved: 12    Patient Education/HEP, Back Education, Therapeutic Exercise, Manual Therapy: Myofacial Release/Cupping, Manual Therapy: Mobilization/Manipulation, Traction, Dry Needling, and HP/CP     Goals  Short Term Goals  Time Frame for Short Term Goals: 6 visits  Short Term Goal 1: Pt to report indepedence and compliance with HEP for Nerve Glides and 1st rib mobility.  Long Term Goals  Time Frame for Long Term Goals : 12 visits  Long Term Goal 1: Pt to improve UEFS from 20/80 to >30/80 to improve ADL roberto  Long Term Goal 2: Pt to report no hand numbness x3 consecutive days.  Long Term Goal 3: Pt to have 4/5 horiz ABD strength with MMT to improve pt posture.  Long Term Goal 4: Pt to have 60deg L cervical rotation to improve driving roberto.     Leana Zapien, PT   Date: 2024    ______________________________________ Date: 2024  Physician Signature  By

## 2024-01-30 NOTE — THERAPY EVALUATION
Phone: [phone]                       [facility]         Fax: [phone]                      Outpatient Physical Therapy                                                                      Evaluation    Date: 2024  Patient: Tita Mary  : 1970  ACCT #: [id number]    Referring Provider (secondary): COURTNEY Donnelly    Diagnosis: Cervical Disc Disorder at C6-C7 level with radiculopathy    Treatment Diagnosis: Neck pain , L UE numbness/pain  Onset Date: 24  PT Insurance Information: BCBS  Total # of Visits Approved: 12 Per Physician Order  Total # of Visits to Date: 1  No Show: 0  Canceled Appointment: 0     Subjective     Additional Pertinent Hx: Pt presents with symptoms of a pinch nerve of the left side of the neck that radiates down to the left arm and fingers. Symptoms began spontaneously on 1/3/24, but pain intensified on 24, which led to an ED visit on 24.  Tried chiropractics, and lidocane patches.  Ibuprofin and prednisone.  Pt reports numbness and pain radiating into the thumb and first finger.  Pt reports her forearm is typically not effected.  Pt reports 5/10 currently, 8/10 at worst.  Pain Assessment  Pain Level: 5          Objective        Spine  Cervical: Flexion=30deg , Ext=50deg , Sidebending: R=30deg, L=30deg , Rotation: R= 73deg, L=45deg  Thoracic: Rotation of T1-2 WNL  Special Tests: L 1st rib elevated and restricted mobility  Strength RUE  Strength RUE: WNL  Strength LUE  L Shoulder Flexion: 3+/5  L Shoulder Extension: 4+/5  L Shoulder Horizontal ABduction: 3/5  L Elbow Flexion: 4+/5  L Elbow Extension: 3+/5  L Wrist Flexion: 3+/5  L Wrist Extension: 4/5    AROM RUE (degrees)  RUE AROM : WNL      Assessment  Body Structures, Functions, Activity Limitations Requiring Skilled Therapeutic Intervention: Decreased functional mobility , Decreased ADL status, Decreased ROM, Decreased strength, Decreased endurance, Decreased posture,

## 2024-02-02 ENCOUNTER — HOSPITAL ENCOUNTER (OUTPATIENT)
Dept: PHYSICAL THERAPY | Age: 54
Setting detail: THERAPIES SERIES
Discharge: HOME | End: 2024-02-02
Payer: COMMERCIAL

## 2024-02-02 PROCEDURE — 97012 MECHANICAL TRACTION THERAPY: CPT

## 2024-02-02 PROCEDURE — 97110 THERAPEUTIC EXERCISES: CPT

## 2024-02-02 ASSESSMENT — PAIN SCALES - GENERAL: PAINLEVEL_OUTOF10: 5

## 2024-02-02 NOTE — PROGRESS NOTES
Phone: [phone]                 [facility]      Fax: [phone]                            Outpatient Physical Therapy                                                                            Daily Note    Date: 2024  Patient Name: Tita Mary        MRN: [id number]   ACCT#:  [id number]  : 1970  (53 y.o.)    Referring Provider (secondary): DEL Donnelly-CNP         Diagnosis: Cervical Disc Disorder at C6-C7 level with radiculopathy  Treatment Diagnosis: Neck pain , L UE numbness/pain    Onset Date: 24  PT Insurance Information: BCBS  Total # of Visits Approved: 12 Per Physician Order  Total # of Visits to Date: 2  No Show: 0  Canceled Appointment: 0    Pre-Treatment Pain:  5/10     Assessment  Assessment: Pt reports compliance with HEP but no change in symptoms.  Pt reports increased radicular symptoms withTband extensions this date.  Initiated cervical traction this date with good roberto, pt reports when traction pull is present numbness is gone, once traction stopped numbness returned.    Plan  Continue with current plan of care    Exercises/Modalities/Manual:  See DocFlow Sheet    Education: Monitor for soreness and pain post traction          Goals  (Total # of Visits to Date: 2)   Short Term Goals  Time Frame for Short Term Goals: 6 visits  Short Term Goal 1: Pt to report indepedence and compliance with HEP for Nerve Glides and 1st rib mobility.    Long Term Goals  Time Frame for Long Term Goals : 12 visits  Long Term Goal 1: Pt to improve UEFS from 20/80 to >30/80 to improve ADL roberto  Long Term Goal 2: Pt to report no hand numbness x3 consecutive days.  Long Term Goal 3: Pt to have 4/5 horiz ABD strength with MMT to improve pt posture.  Long Term Goal 4: Pt to have 60deg L cervical rotation to improve driving roberto.    Post Treatment Pain:  5/10    Time In: 1238  Time Out: 1305  Timed Code Treatment Minutes: 15 Minutes  Total Treatment Time: 50 Minutes    Leana HERMAN

## 2024-02-05 ENCOUNTER — HOSPITAL ENCOUNTER (OUTPATIENT)
Dept: PHYSICAL THERAPY | Age: 54
Setting detail: THERAPIES SERIES
Discharge: HOME | End: 2024-02-05
Payer: COMMERCIAL

## 2024-02-05 ENCOUNTER — HOSPITAL ENCOUNTER (OUTPATIENT)
Age: 54
Discharge: HOME | End: 2024-02-07
Payer: COMMERCIAL

## 2024-02-05 ENCOUNTER — HOSPITAL ENCOUNTER (OUTPATIENT)
Dept: GENERAL RADIOLOGY | Age: 54
Discharge: HOME | End: 2024-02-07
Payer: COMMERCIAL

## 2024-02-05 DIAGNOSIS — M62.838 MUSCLE SPASMS OF NECK: ICD-10-CM

## 2024-02-05 DIAGNOSIS — M50.123 CERVICAL DISC DISORDER AT C6-C7 LEVEL WITH RADICULOPATHY: ICD-10-CM

## 2024-02-05 PROCEDURE — 72050 X-RAY EXAM NECK SPINE 4/5VWS: CPT

## 2024-02-05 PROCEDURE — 97110 THERAPEUTIC EXERCISES: CPT

## 2024-02-05 PROCEDURE — 97012 MECHANICAL TRACTION THERAPY: CPT

## 2024-02-05 NOTE — PROGRESS NOTES
Phone: [phone]                 [facility]      Fax: [phone]                            Outpatient Physical Therapy                                                                            Daily Note    Date: 2024  Patient Name: Tita Mary        MRN: [id number]   ACCT#:  [id number]  : 1970  (53 y.o.)    Referring Provider (secondary): Hafsa Simons APRN-CNP         Diagnosis: Cervical Disc Disorder at C6-C7 level with radiculopathy  Treatment Diagnosis: Neck pain , L UE numbness/pain    Onset Date: 24  PT Insurance Information: BCBS  Total # of Visits Approved: 12 Per Physician Order  Total # of Visits to Date: 3  No Show: 0  Canceled Appointment: 0    Pre-Treatment Pain:  N/A/10     Assessment  Assessment: Patient arrives not giving pain number stating her pain shoots and has numbness down into fingers along with some burning Pt reports good tolerance to traction treatment but symptoms reoccur as soon as treatment is finished. Increased rate of pull to 14# today in attempt for pt to have relief for longer period of time. Pt completed decompression treatment in [facility] stating the treatment gave her a HA and she noticed no improvement. Pt left feeling better but symptoms in finger reoccured a soon as she was off traction. Plan to continue with current POC at this time.    Plan  Continue with current plan of care    Exercises/Modalities/Manual:  See DocFlow Sheet    Education: HEP          Goals  (Total # of Visits to Date: 3)   Short Term Goals  Time Frame for Short Term Goals: 6 visits  Short Term Goal 1: Pt to report indepedence and compliance with HEP for Nerve Glides and 1st rib mobility.    Long Term Goals  Time Frame for Long Term Goals : 12 visits  Long Term Goal 1: Pt to improve UEFS from 20/80 to >30/80 to improve ADL roberto  Long Term Goal 2: Pt to report no hand numbness x3 consecutive days.  Long Term Goal 3: Pt to have 4/5 horiz ABD strength with MMT to

## 2024-02-09 ENCOUNTER — HOSPITAL ENCOUNTER (OUTPATIENT)
Dept: PHYSICAL THERAPY | Age: 54
Setting detail: THERAPIES SERIES
Discharge: HOME | End: 2024-02-09
Payer: COMMERCIAL

## 2024-02-09 PROCEDURE — 97110 THERAPEUTIC EXERCISES: CPT

## 2024-02-09 PROCEDURE — 97012 MECHANICAL TRACTION THERAPY: CPT

## 2024-02-09 ASSESSMENT — PAIN SCALES - GENERAL: PAINLEVEL_OUTOF10: 3

## 2024-02-09 NOTE — PROGRESS NOTES
Phone: [phone]                 [facility]      Fax: [phone]                            Outpatient Physical Therapy                                                                            Daily Note    Date: 2024  Patient Name: Tita Mary        MRN: [id number]   ACCT#:  [id number]  : 1970  (53 y.o.)    Referring Provider (secondary): Hafsa Simons APRN-CNP         Diagnosis: Cervical Disc Disorder at C6-C7 level with radiculopathy  Treatment Diagnosis: Neck pain , L UE numbness/pain    Onset Date: 24  PT Insurance Information: BCBS  Total # of Visits Approved: 12 Per Physician Order  Total # of Visits to Date: 4  No Show: 0  Canceled Appointment: 0    Pre-Treatment Pain:  3/10     Assessment  Assessment: Pt reports starting Lyrica which has decreased the shooting pain.  Pt also reports since starting PT numbness is improving. She is able to tell when she has grasp on a object.    Plan  Continue with current plan of care    Exercises/Modalities/Manual:  See DocFlow Sheet    Education: Monitor roberto to increased traction    Goals  (Total # of Visits to Date: 4)   Short Term Goals  Time Frame for Short Term Goals: 6 visits  Short Term Goal 1: Pt to report indepedence and compliance with HEP for Nerve Glides and 1st rib mobility.    Long Term Goals  Time Frame for Long Term Goals : 12 visits  Long Term Goal 1: Pt to improve UEFS from 20/80 to >30/80 to improve ADL roberto  Long Term Goal 2: Pt to report no hand numbness x3 consecutive days.  Long Term Goal 3: Pt to have 4/5 horiz ABD strength with MMT to improve pt posture.  Long Term Goal 4: Pt to have 60deg L cervical rotation to improve driving roberto.    Post Treatment Pain:  1/10    Time In: 1311  Time Out: 1351  Timed Code Treatment Minutes: 30 Minutes  Total Treatment Time: 40 Minutes    Leana Zapien, PT     Date: 2024

## 2024-02-12 ENCOUNTER — HOSPITAL ENCOUNTER (OUTPATIENT)
Dept: PHYSICAL THERAPY | Age: 54
Setting detail: THERAPIES SERIES
Discharge: HOME | End: 2024-02-12
Payer: COMMERCIAL

## 2024-02-12 PROCEDURE — 97110 THERAPEUTIC EXERCISES: CPT

## 2024-02-12 PROCEDURE — 97012 MECHANICAL TRACTION THERAPY: CPT

## 2024-02-12 NOTE — PROGRESS NOTES
Phone: [phone]                 [facility]      Fax: [phone]                            Outpatient Physical Therapy                                                                            Daily Note    Date: 2024  Patient Name: Tita Mary        MRN: [id number]   ACCT#:  [id number]  : 1970  (53 y.o.)    Referring Provider (secondary): DEL Donnelly-CNP         Diagnosis: Cervical Disc Disorder at C6-C7 level with radiculopathy  Treatment Diagnosis: Neck pain , L UE numbness/pain    Onset Date: 24  PT Insurance Information: BCBS  Total # of Visits Approved: 12 Per Physician Order  Total # of Visits to Date: 5  No Show: 0  Canceled Appointment: 0    Pre-Treatment Pain:  2/10     Assessment  Assessment: Pt reports numbness comes and goes now vs constant.  Pt reports it will be gone for a couple hours but then return.  Pt reports noting if she rests her L arm on an arm rest it tends to go numb.  No soreness post last session, increased pull this date as well as duration, well roberto at time of treatment.  Will monitor.  Plan to check cervical ROM next 1-2 visits.    Plan  Continue with current plan of care    Exercises/Modalities/Manual:  See DocFlow Sheet    Education: Progression of pull and time          Goals  (Total # of Visits to Date: 5)   Short Term Goals  Time Frame for Short Term Goals: 6 visits  Short Term Goal 1: Pt to report indepedence and compliance with HEP for Nerve Glides and 1st rib mobility.    Long Term Goals  Time Frame for Long Term Goals : 12 visits  Long Term Goal 1: Pt to improve UEFS from 20/80 to >30/80 to improve ADL roberto  Long Term Goal 2: Pt to report no hand numbness x3 consecutive days.  Long Term Goal 3: Pt to have 4/5 horiz ABD strength with MMT to improve pt posture.  Long Term Goal 4: Pt to have 60deg L cervical rotation to improve driving roberto.    Post Treatment Pain:  0/10    Time In: 1305    Time Out : 1347      Total time:

## 2024-02-13 DIAGNOSIS — M54.12 LEFT CERVICAL RADICULOPATHY: ICD-10-CM

## 2024-02-13 DIAGNOSIS — R20.0 NUMBNESS AND TINGLING IN LEFT ARM: Primary | ICD-10-CM

## 2024-02-13 DIAGNOSIS — R20.2 NUMBNESS AND TINGLING IN LEFT ARM: Primary | ICD-10-CM

## 2024-02-13 DIAGNOSIS — M50.123 CERVICAL DISC DISORDER AT C6-C7 LEVEL WITH RADICULOPATHY: ICD-10-CM

## 2024-02-16 ENCOUNTER — APPOINTMENT (OUTPATIENT)
Dept: PHYSICAL THERAPY | Age: 54
End: 2024-02-16
Payer: COMMERCIAL

## 2024-02-19 ENCOUNTER — HOSPITAL ENCOUNTER (OUTPATIENT)
Dept: PHYSICAL THERAPY | Age: 54
Setting detail: THERAPIES SERIES
Discharge: HOME | End: 2024-02-19
Payer: COMMERCIAL

## 2024-02-19 NOTE — PROGRESS NOTES
Occupational Therapy  [facility]  [facility]    Date: 2024  Patient Name: Tita Mary        : 1970       Pt Cancelled Appt due to left voice mail to cancel and stated she wanted to follow up with doctor.       Rosa M Hui Date: 2024

## 2024-02-23 ENCOUNTER — APPOINTMENT (OUTPATIENT)
Dept: PHYSICAL THERAPY | Age: 54
End: 2024-02-23
Payer: COMMERCIAL

## 2024-02-23 ENCOUNTER — OFFICE VISIT (OUTPATIENT)
Dept: PRIMARY CARE CLINIC | Age: 54
End: 2024-02-23
Payer: COMMERCIAL

## 2024-02-23 VITALS
OXYGEN SATURATION: 97 % | BODY MASS INDEX: 45.52 KG/M2 | HEART RATE: 67 BPM | DIASTOLIC BLOOD PRESSURE: 80 MMHG | WEIGHT: 293 LBS | SYSTOLIC BLOOD PRESSURE: 138 MMHG

## 2024-02-23 DIAGNOSIS — M62.838 MUSCLE SPASMS OF NECK: ICD-10-CM

## 2024-02-23 DIAGNOSIS — M50.123 CERVICAL DISC DISORDER AT C6-C7 LEVEL WITH RADICULOPATHY: Primary | ICD-10-CM

## 2024-02-23 DIAGNOSIS — M54.2 NECK PAIN, ACUTE: ICD-10-CM

## 2024-02-23 PROCEDURE — 99213 OFFICE O/P EST LOW 20 MIN: CPT | Performed by: NURSE PRACTITIONER

## 2024-02-23 RX ORDER — PREGABALIN 25 MG/1
25 CAPSULE ORAL 2 TIMES DAILY PRN
Qty: 60 CAPSULE | Refills: 2 | Status: SHIPPED | OUTPATIENT
Start: 2024-02-23 | End: 2024-05-23

## 2024-02-26 ENCOUNTER — TELEPHONE (OUTPATIENT)
Dept: GASTROENTEROLOGY | Age: 54
End: 2024-02-26

## 2024-02-26 ASSESSMENT — ENCOUNTER SYMPTOMS
CHEST TIGHTNESS: 0
SHORTNESS OF BREATH: 0
ABDOMINAL DISTENTION: 0
EYES NEGATIVE: 1
WHEEZING: 0
SORE THROAT: 0
COUGH: 0

## 2024-02-26 NOTE — PROGRESS NOTES
HPI Notes    Name: Tita Mary  : 1970         Chief Complaint:     Chief Complaint   Patient presents with    Neck Pain     1 month follow up- pt states she is doing ok.  States PT didn't really help.        History of Present Illness:        Neck Pain   Pertinent negatives include no chest pain, fever, headaches or weakness.     Follow up neck pain/spasm  In physical therapy feels helped some   Lyrica working well for right neck radiculopathy    Abnormal xray right neck with C4-5 and C5-6 changes.   Pt denies any need for MRI cervical is ordered , does not choose to do more content with improvement and maintenance. Will try to see if she can work through it.     Cautioned pt with delaying numbness and reflex changes. She is aware of risk and is comfortable with current level of improvement .   Pain today is 1-2 for neck and denies radiculopathy.         Past Medical History:     Past Medical History:   Diagnosis Date    Psoriasis       Reviewed all health maintenance requirements and ordered appropriate tests  Health Maintenance Due   Topic Date Due    Hepatitis B vaccine (1 of 3 - 3-dose series) Never done    COVID-19 Vaccine (1) Never done    HIV screen  Never done    Hepatitis C screen  Never done    DTaP/Tdap/Td vaccine (1 - Tdap) Never done    Colorectal Cancer Screen  Never done    Diabetes screen  2017    Cervical cancer screen  2017    Shingles vaccine (1 of 2) Never done    Flu vaccine (1) Never done       Past Surgical History:     Past Surgical History:   Procedure Laterality Date    ABDOMEN SURGERY  2003     SECTION  2003    TUBAL LIGATION  2003        Medications:       Prior to Admission medications    Medication Sig Start Date End Date Taking? Authorizing Provider   pregabalin (LYRICA) 25 MG capsule Take 1 capsule by mouth 2 times daily as needed (C5-6 radiculopathy) for up to 90 days. 24 Yes Hafsa Simons, APRN - CNP   montelufernie

## 2024-02-26 NOTE — TELEPHONE ENCOUNTER
Patient contacted the office stating she needs to reschedule her colonoscopy due to work schedule. She was currently scheduled with Dr. Gunderson on 04/09/2024. Please advise. Thank you.

## 2024-02-27 ENCOUNTER — TELEPHONE (OUTPATIENT)
Dept: FAMILY MEDICINE CLINIC | Age: 54
End: 2024-02-27

## 2024-02-27 NOTE — TELEPHONE ENCOUNTER
Patient called back to the office stating she will have to again reschedule her surgery due to work schedule. Patient advised she had scheduled for 06/11/2024 but it will not work and needs a new date. Informed patient the providers would be informed and the office would be back in contact with her. She voiced understanding.

## 2024-03-14 ENCOUNTER — TELEPHONE (OUTPATIENT)
Dept: GASTROENTEROLOGY | Age: 54
End: 2024-03-14

## 2024-03-14 NOTE — TELEPHONE ENCOUNTER
SURGERY SCHEDULING FORM  [facility]   [address] [place] [address]    Phone [phone] Fax [phone]      Tita Gr Usman 1970 female    [address] [address]   Marital Status:          Home Phone: [phone]      Cell Phone:    Telephone Information:   Mobile [phone]          Surgeon: Neptali   Surgery Date: 6/18/24     Time:     Procedure: Colonoscopy     Diagnosis: Z12.11, Z80.0     Important Medical History:  In Epic    Special Inst/Equip: Regular    CPT Codes:    99211  Latex Allergy: No     Cardiac Device:  No    Anesthesia:  MAC          Admission Type:  Same Day                          Admit Prior to Day of Surgery: No    Case Location:  Ambulatory            Preadmission Testing:  Phone Call          PAT Date and Time:     Need Preop Cardiac Clearance: No    Does Patient have Cardiologist/physician?     No

## 2024-04-16 ENCOUNTER — TELEPHONE (OUTPATIENT)
Dept: GASTROENTEROLOGY | Age: 54
End: 2024-04-16

## 2024-04-16 NOTE — TELEPHONE ENCOUNTER
LVM for patient, she is scheduled 6/18 for scope with Dr Gunderson. This needs to be moved to possibly 6/10 or 6/3.

## 2024-04-19 ENCOUNTER — TELEPHONE (OUTPATIENT)
Dept: PRIMARY CARE CLINIC | Age: 54
End: 2024-04-19

## 2024-04-19 DIAGNOSIS — Z12.11 COLON CANCER SCREENING: Primary | ICD-10-CM

## 2024-04-19 NOTE — TELEPHONE ENCOUNTER
Vidhya 3 is now full. We will have to offer her Vidhya 10 or another open day. This patient has a hard time getting time off from her job, I know.

## 2024-04-19 NOTE — TELEPHONE ENCOUNTER
Tita called in stating that they keep changing her date for her colonoscopy. They tried to change it again and she had already made arrangements to be off work and can not change it again. They have canceled it and she does not want to reschedule after all the hassle she has been through. She was requesting to do cologuard instead. Please advise.

## 2024-04-22 ENCOUNTER — PATIENT MESSAGE (OUTPATIENT)
Dept: FAMILY MEDICINE CLINIC | Age: 54
End: 2024-04-22

## 2024-05-04 DIAGNOSIS — M62.838 MUSCLE SPASMS OF NECK: ICD-10-CM

## 2024-05-04 DIAGNOSIS — M50.123 CERVICAL DISC DISORDER AT C6-C7 LEVEL WITH RADICULOPATHY: ICD-10-CM

## 2024-05-06 NOTE — TELEPHONE ENCOUNTER
Last OV: 2/23/2024  Last RX: 2/23/2024   Next scheduled apt: 5/23/2024      Pt requesting refill

## 2024-05-07 LAB — NONINV COLON CA DNA+OCC BLD SCRN STL QL: NEGATIVE

## 2024-05-07 RX ORDER — PREGABALIN 25 MG/1
25 CAPSULE ORAL 2 TIMES DAILY PRN
Qty: 60 CAPSULE | Refills: 2 | Status: SHIPPED | OUTPATIENT
Start: 2024-05-07 | End: 2024-08-05

## 2024-05-23 ENCOUNTER — OFFICE VISIT (OUTPATIENT)
Dept: PRIMARY CARE CLINIC | Age: 54
End: 2024-05-23
Payer: COMMERCIAL

## 2024-05-23 VITALS
BODY MASS INDEX: 41.95 KG/M2 | OXYGEN SATURATION: 97 % | DIASTOLIC BLOOD PRESSURE: 78 MMHG | SYSTOLIC BLOOD PRESSURE: 118 MMHG | HEART RATE: 72 BPM | WEIGHT: 293 LBS | HEIGHT: 70 IN

## 2024-05-23 DIAGNOSIS — E55.9 VITAMIN D DEFICIENCY: ICD-10-CM

## 2024-05-23 DIAGNOSIS — Z13.29 SCREENING FOR THYROID DISORDER: ICD-10-CM

## 2024-05-23 DIAGNOSIS — Z00.00 ENCOUNTER FOR WELL ADULT EXAM WITHOUT ABNORMAL FINDINGS: Primary | ICD-10-CM

## 2024-05-23 DIAGNOSIS — Z72.0 VAPES NICOTINE CONTAINING SUBSTANCE: ICD-10-CM

## 2024-05-23 DIAGNOSIS — L30.8 OTHER ECZEMA: ICD-10-CM

## 2024-05-23 DIAGNOSIS — Z12.31 ENCOUNTER FOR SCREENING MAMMOGRAM FOR BREAST CANCER: ICD-10-CM

## 2024-05-23 DIAGNOSIS — Z13.220 SCREENING CHOLESTEROL LEVEL: ICD-10-CM

## 2024-05-23 DIAGNOSIS — M50.123 CERVICAL DISC DISORDER AT C6-C7 LEVEL WITH RADICULOPATHY: ICD-10-CM

## 2024-05-23 DIAGNOSIS — D72.820 LYMPHOCYTOSIS: ICD-10-CM

## 2024-05-23 DIAGNOSIS — Z12.31 SCREENING MAMMOGRAM, ENCOUNTER FOR: ICD-10-CM

## 2024-05-23 DIAGNOSIS — Z13.1 SCREENING FOR DIABETES MELLITUS: ICD-10-CM

## 2024-05-23 DIAGNOSIS — E66.01 MORBID OBESITY WITH BMI OF 45.0-49.9, ADULT (HCC): ICD-10-CM

## 2024-05-23 PROCEDURE — 99396 PREV VISIT EST AGE 40-64: CPT | Performed by: NURSE PRACTITIONER

## 2024-05-23 RX ORDER — TRIAMCINOLONE ACETONIDE 1 MG/G
CREAM TOPICAL
Qty: 28.3 G | Refills: 0 | Status: SHIPPED | OUTPATIENT
Start: 2024-05-23

## 2024-05-23 ASSESSMENT — ENCOUNTER SYMPTOMS
EYES NEGATIVE: 1
ABDOMINAL DISTENTION: 0
CHEST TIGHTNESS: 0
SORE THROAT: 0
RHINORRHEA: 0

## 2024-05-23 NOTE — PROGRESS NOTES
intolerance and heat intolerance.   Genitourinary:  Negative for difficulty urinating.   Musculoskeletal:  Negative for arthralgias.   Skin:  Negative for rash.   Neurological:  Negative for dizziness and headaches.   Psychiatric/Behavioral:  Negative for decreased concentration. The patient is not nervous/anxious.        Allergies   Allergen Reactions    Poison Ivy Extract Anaphylaxis     Pt reported (approx since )    Bee Venom Swelling    Other      States reaction to absorbable sutures         Prior to Visit Medications    Medication Sig Taking? Authorizing Provider   triamcinolone (KENALOG) 0.1 % cream Apply to the affected area daily prn. Do not use near eyes or private area Yes Hafsa Simons APRN - CNP   pregabalin (LYRICA) 25 MG capsule Take 1 capsule by mouth 2 times daily as needed (C5-6 radiculopathy) for up to 90 days. Yes Hafsa Simons APRN - CNP   ondansetron (ZOFRAN) 4 MG tablet Take 1 tablet by mouth every 8 hours as needed for Nausea  Patient not taking: Reported on 2024  Ashley Yang MD   montelukast (SINGULAIR) 10 MG tablet TAKE 1 TABLET BY MOUTH DAILY  Patient taking differently: Take 1 tablet by mouth daily as needed (allergies)  Hafsa Simons APRN - CNP         Past Medical History:   Diagnosis Date    Psoriasis        Past Surgical History:   Procedure Laterality Date    ABDOMEN SURGERY  2003     SECTION  2003    TUBAL LIGATION  2003         Family History   Problem Relation Age of Onset    Breast Cancer Maternal Grandmother     Lung Cancer Maternal Grandfather     Diabetes Type 1  Paternal Grandfather        Social History     Tobacco Use    Smoking status: Former     Current packs/day: 0.00     Average packs/day: 0.3 packs/day for 30.0 years (7.5 ttl pk-yrs)     Types: Cigarettes     Start date: 1987     Quit date: 2017     Years since quittin.4    Smokeless tobacco: Never   Vaping Use    Vaping Use: Every day    Substances: Nicotine

## 2024-08-26 SDOH — ECONOMIC STABILITY: TRANSPORTATION INSECURITY
IN THE PAST 12 MONTHS, HAS LACK OF TRANSPORTATION KEPT YOU FROM MEETINGS, WORK, OR FROM GETTING THINGS NEEDED FOR DAILY LIVING?: NO

## 2024-08-26 SDOH — ECONOMIC STABILITY: FOOD INSECURITY: WITHIN THE PAST 12 MONTHS, THE FOOD YOU BOUGHT JUST DIDN'T LAST AND YOU DIDN'T HAVE MONEY TO GET MORE.: NEVER TRUE

## 2024-08-26 SDOH — ECONOMIC STABILITY: FOOD INSECURITY: WITHIN THE PAST 12 MONTHS, YOU WORRIED THAT YOUR FOOD WOULD RUN OUT BEFORE YOU GOT MONEY TO BUY MORE.: NEVER TRUE

## 2024-08-26 SDOH — ECONOMIC STABILITY: INCOME INSECURITY: HOW HARD IS IT FOR YOU TO PAY FOR THE VERY BASICS LIKE FOOD, HOUSING, MEDICAL CARE, AND HEATING?: NOT VERY HARD

## 2024-08-27 ENCOUNTER — OFFICE VISIT (OUTPATIENT)
Dept: PRIMARY CARE CLINIC | Age: 54
End: 2024-08-27
Payer: COMMERCIAL

## 2024-08-27 VITALS
BODY MASS INDEX: 48.35 KG/M2 | SYSTOLIC BLOOD PRESSURE: 130 MMHG | DIASTOLIC BLOOD PRESSURE: 82 MMHG | WEIGHT: 293 LBS | OXYGEN SATURATION: 96 % | HEART RATE: 70 BPM

## 2024-08-27 DIAGNOSIS — L71.9 ROSACEA: ICD-10-CM

## 2024-08-27 DIAGNOSIS — M50.123 CERVICAL DISC DISORDER AT C6-C7 LEVEL WITH RADICULOPATHY: Primary | ICD-10-CM

## 2024-08-27 DIAGNOSIS — M62.838 MUSCLE SPASMS OF NECK: ICD-10-CM

## 2024-08-27 PROCEDURE — 99213 OFFICE O/P EST LOW 20 MIN: CPT | Performed by: NURSE PRACTITIONER

## 2024-08-27 RX ORDER — PREGABALIN 25 MG/1
25 CAPSULE ORAL 2 TIMES DAILY PRN
Qty: 60 CAPSULE | Refills: 2 | Status: SHIPPED | OUTPATIENT
Start: 2024-08-27 | End: 2024-11-25

## 2024-08-27 RX ORDER — DOXYCYCLINE HYCLATE 100 MG
100 TABLET ORAL 2 TIMES DAILY
Qty: 28 TABLET | Refills: 0 | Status: SHIPPED | OUTPATIENT
Start: 2024-08-27 | End: 2024-09-10

## 2024-08-31 ASSESSMENT — ENCOUNTER SYMPTOMS
COUGH: 0
SHORTNESS OF BREATH: 0
SORE THROAT: 0
CHEST TIGHTNESS: 0
WHEEZING: 0
ABDOMINAL DISTENTION: 0
EYES NEGATIVE: 1

## 2024-12-05 DIAGNOSIS — M62.838 MUSCLE SPASMS OF NECK: ICD-10-CM

## 2024-12-05 DIAGNOSIS — M50.123 CERVICAL DISC DISORDER AT C6-C7 LEVEL WITH RADICULOPATHY: ICD-10-CM

## 2024-12-06 NOTE — TELEPHONE ENCOUNTER
Last OV: 8/27/2024  Last RX: 8/27/2024   Next scheduled apt: 3/4/2025    Surescripts requesting refill

## 2024-12-09 RX ORDER — PREGABALIN 25 MG/1
25 CAPSULE ORAL 2 TIMES DAILY PRN
Qty: 60 CAPSULE | Refills: 2 | Status: SHIPPED | OUTPATIENT
Start: 2024-12-09 | End: 2025-03-09

## 2025-03-04 ENCOUNTER — OFFICE VISIT (OUTPATIENT)
Dept: PRIMARY CARE CLINIC | Age: 55
End: 2025-03-04
Payer: COMMERCIAL

## 2025-03-04 VITALS
OXYGEN SATURATION: 98 % | BODY MASS INDEX: 41.95 KG/M2 | DIASTOLIC BLOOD PRESSURE: 88 MMHG | HEART RATE: 75 BPM | WEIGHT: 293 LBS | HEIGHT: 70 IN | SYSTOLIC BLOOD PRESSURE: 130 MMHG

## 2025-03-04 DIAGNOSIS — M62.838 MUSCLE SPASMS OF NECK: ICD-10-CM

## 2025-03-04 DIAGNOSIS — E55.9 VITAMIN D DEFICIENCY: ICD-10-CM

## 2025-03-04 DIAGNOSIS — M50.123 CERVICAL DISC DISORDER AT C6-C7 LEVEL WITH RADICULOPATHY: Primary | ICD-10-CM

## 2025-03-04 DIAGNOSIS — Z72.0 VAPES NICOTINE CONTAINING SUBSTANCE: ICD-10-CM

## 2025-03-04 DIAGNOSIS — E66.01 MORBID OBESITY WITH BMI OF 45.0-49.9, ADULT: ICD-10-CM

## 2025-03-04 PROCEDURE — 99213 OFFICE O/P EST LOW 20 MIN: CPT | Performed by: NURSE PRACTITIONER

## 2025-03-04 RX ORDER — PREGABALIN 25 MG/1
25 CAPSULE ORAL 2 TIMES DAILY PRN
Qty: 60 CAPSULE | Refills: 2 | Status: SHIPPED | OUTPATIENT
Start: 2025-03-04 | End: 2025-06-02

## 2025-03-04 SDOH — ECONOMIC STABILITY: FOOD INSECURITY: WITHIN THE PAST 12 MONTHS, THE FOOD YOU BOUGHT JUST DIDN'T LAST AND YOU DIDN'T HAVE MONEY TO GET MORE.: NEVER TRUE

## 2025-03-04 SDOH — ECONOMIC STABILITY: FOOD INSECURITY: WITHIN THE PAST 12 MONTHS, YOU WORRIED THAT YOUR FOOD WOULD RUN OUT BEFORE YOU GOT MONEY TO BUY MORE.: NEVER TRUE

## 2025-03-04 ASSESSMENT — PATIENT HEALTH QUESTIONNAIRE - PHQ9
SUM OF ALL RESPONSES TO PHQ QUESTIONS 1-9: 0
SUM OF ALL RESPONSES TO PHQ QUESTIONS 1-9: 0
1. LITTLE INTEREST OR PLEASURE IN DOING THINGS: NOT AT ALL
SUM OF ALL RESPONSES TO PHQ QUESTIONS 1-9: 0
2. FEELING DOWN, DEPRESSED OR HOPELESS: NOT AT ALL
SUM OF ALL RESPONSES TO PHQ QUESTIONS 1-9: 0

## 2025-03-04 NOTE — PROGRESS NOTES
[facility] [place]  [address] [address]  Dept: [phone]  Dept Fax: [phone]    Last encounter 2024    6 Month Follow-Up       HPI:   Tita Mary is a 54 y.o. female who presentstoday for her medical conditions/complaints as noted below.  Tita Mary is c/o of 6 Month Follow-Up      HPI  Established patient    54 year old female here today for cervical radiculopathy   Currently not missing any work   Able to control pain and numbness improved in left arm for lyrica     Uses singluair for seasonal allergies sporatically only .     Prevnar 20 and Tdap vaccines encouraged.     30 pack year smoking history quit in  with cigarettes, and transition to vaping.     Morbid obesity BMI 47 encourage increase activity weight loss, consider GLP-1 pt will think about it.       Reviewed prior notes None  Reviewed previous Labs, Imaging, and Hospital Records    Past Medical History:   Diagnosis Date    Psoriasis       Past Surgical History:   Procedure Laterality Date    ABDOMEN SURGERY  2003     SECTION  2003    TUBAL LIGATION  2003       Family History   Problem Relation Age of Onset    Breast Cancer Maternal Grandmother     Lung Cancer Maternal Grandfather     Diabetes Type 1  Paternal Grandfather        Social History     Tobacco Use    Smoking status: Former     Current packs/day: 0.00     Average packs/day: 0.3 packs/day for 30.0 years (7.5 ttl pk-yrs)     Types: Cigarettes     Start date: 1987     Quit date: 2017     Years since quittin.2    Smokeless tobacco: Never   Substance Use Topics    Alcohol use: No      Current Outpatient Medications   Medication Sig Dispense Refill    pregabalin (LYRICA) 25 MG capsule Take 1 capsule by mouth 2 times daily as needed (cervical radiculopathy) for up to 90 days. Max Daily Amount: 50 mg 60 capsule 2    montelukast (SINGULAIR) 10

## 2025-03-04 NOTE — PATIENT INSTRUCTIONS
THANK YOU FOR TRUSTING US WITH YOUR HEALTHCARE !!    The associates caring for you today were:    Family Practice Provider: Hafsa MATHIS-LES    Clinical Team Nurse: Sunita Brenner LPN    Clinical Team Medical Assistant: Jess Fournier MA    Our goal is to provide you with EXCEPTIONAL patient care, and we strive to do this for EVERY patient, EVERY visit. Since we care about you and your experience, you may receive a patient satisfaction survey from [employer] by postal mail/email/text. We truly welcome your feedback and ask that you complete the survey to let us know how we are doing.    Important Numbers:  [facility] phone [phone]   [place] Office Nurse/MA Line: [phone]  Fax  [phone]   Central [phone]  Billing questions: [phone]  Medical Records Request: [phone]    Manage your healthcare  with [facility] My[facility]. To activate your account, visit [email or web address]   DIGITAL scheduling available now through my chart.  Schedule your next appointment at your convenience through your my chart.       [place] Office Hours:  Monday: [place] office location 8-5 ([phone]) Offering additional late hours the first Monday of the month until 7 pm.   Tuesday: 8: :  812 Noon, closed  of the month   : 7:30-4:30   SURVEY:    You may be receiving a survey from [employer] regarding your visit today.    Please complete the survey to enable us to provide the highest quality of care to you and your family.    If you cannot score us a very good on any question, please call the office to discuss how we could have made your experience a very good one.    Thank you.     Please update Tdap (tetanus , diptheria and pertussis) at local pharmacy

## 2025-03-15 ASSESSMENT — ENCOUNTER SYMPTOMS
SORE THROAT: 0
EYES NEGATIVE: 1
CHEST TIGHTNESS: 0
WHEEZING: 0
SHORTNESS OF BREATH: 0
COUGH: 0

## 2025-04-07 DIAGNOSIS — J01.00 ACUTE MAXILLARY SINUSITIS, RECURRENCE NOT SPECIFIED: ICD-10-CM

## 2025-04-07 RX ORDER — MONTELUKAST SODIUM 10 MG/1
10 TABLET ORAL DAILY PRN
Qty: 90 TABLET | Refills: 1 | Status: SHIPPED | OUTPATIENT
Start: 2025-04-07

## 2025-04-07 NOTE — TELEPHONE ENCOUNTER
Last OV: 3/4/2025  Last RX: 9/14/2023   Next scheduled apt: 6/3/2025    Surescripts requesting refill

## 2025-05-27 ENCOUNTER — TELEPHONE (OUTPATIENT)
Dept: PRIMARY CARE CLINIC | Age: 55
End: 2025-05-27

## 2025-05-27 DIAGNOSIS — D72.820 LYMPHOCYTOSIS: ICD-10-CM

## 2025-05-27 DIAGNOSIS — R79.89 ABNORMAL CBC: Primary | ICD-10-CM

## 2025-05-28 NOTE — TELEPHONE ENCOUNTER
Pt did recent consumer labs (please enter/edit)     Sugar, kidney, electrolytes and liver okay   Thyroid good at 0.39   Cholesterol is high      , VLDL 39   HDL 44  ASCVD risk 2.7% , need to stay active, heart healthy diet.   Walking helps 150  minutes exercise per week       Blood count   Hgb 13.8 and HCT 42.1 NO anemia but differential with blood count abnormal high lymphocytes which has been consistent. Will need further evaluation with hematology to rule out blood disorder .   Will also need additional labs with blood specialist usually flow cytometry and blood smear needed.     Refer to hematology

## 2025-06-03 ENCOUNTER — OFFICE VISIT (OUTPATIENT)
Dept: PRIMARY CARE CLINIC | Age: 55
End: 2025-06-03
Payer: COMMERCIAL

## 2025-06-03 ENCOUNTER — TELEPHONE (OUTPATIENT)
Dept: PRIMARY CARE CLINIC | Age: 55
End: 2025-06-03

## 2025-06-03 VITALS
BODY MASS INDEX: 41.95 KG/M2 | SYSTOLIC BLOOD PRESSURE: 130 MMHG | HEIGHT: 70 IN | HEART RATE: 68 BPM | WEIGHT: 293 LBS | DIASTOLIC BLOOD PRESSURE: 84 MMHG | OXYGEN SATURATION: 95 %

## 2025-06-03 DIAGNOSIS — E55.9 VITAMIN D DEFICIENCY: ICD-10-CM

## 2025-06-03 DIAGNOSIS — Z00.00 WELL ADULT EXAM: Primary | ICD-10-CM

## 2025-06-03 DIAGNOSIS — L30.8 OTHER ECZEMA: ICD-10-CM

## 2025-06-03 DIAGNOSIS — M62.838 MUSCLE SPASMS OF NECK: ICD-10-CM

## 2025-06-03 DIAGNOSIS — M50.123 CERVICAL DISC DISORDER AT C6-C7 LEVEL WITH RADICULOPATHY: ICD-10-CM

## 2025-06-03 DIAGNOSIS — Z72.0 VAPES NICOTINE CONTAINING SUBSTANCE: ICD-10-CM

## 2025-06-03 DIAGNOSIS — L71.9 ROSACEA: ICD-10-CM

## 2025-06-03 DIAGNOSIS — D72.820 LYMPHOCYTOSIS: ICD-10-CM

## 2025-06-03 DIAGNOSIS — R79.89 ABNORMAL CBC: ICD-10-CM

## 2025-06-03 DIAGNOSIS — E66.01 MORBID OBESITY WITH BMI OF 45.0-49.9, ADULT (HCC): ICD-10-CM

## 2025-06-03 PROCEDURE — 99396 PREV VISIT EST AGE 40-64: CPT | Performed by: NURSE PRACTITIONER

## 2025-06-03 RX ORDER — TRIAMCINOLONE ACETONIDE 1 MG/G
CREAM TOPICAL
Qty: 80 G | Refills: 0 | Status: SHIPPED | OUTPATIENT
Start: 2025-06-03 | End: 2025-06-03 | Stop reason: SDUPTHER

## 2025-06-03 RX ORDER — PREGABALIN 25 MG/1
25 CAPSULE ORAL 2 TIMES DAILY PRN
Qty: 60 CAPSULE | Refills: 2 | Status: CANCELLED | OUTPATIENT
Start: 2025-06-03 | End: 2025-09-01

## 2025-06-03 RX ORDER — TRIAMCINOLONE ACETONIDE 1 MG/G
CREAM TOPICAL
Qty: 80 G | Refills: 0 | Status: SHIPPED | OUTPATIENT
Start: 2025-06-03

## 2025-06-03 NOTE — TELEPHONE ENCOUNTER
Zabrina with [facility] called in needs an estimated day supply on triamcinolone (KENALOG) 0.1 % cream [[id number]] in order to fill.  Please advise.      Health Maintenance   Topic Date Due    HIV screen  Never done    Hepatitis C screen  Never done    Hepatitis B vaccine (1 of 3 - 19+ 3-dose series) Never done    DTaP/Tdap/Td vaccine (1 - Tdap) Never done    Cervical cancer screen  06/03/2017    Shingles vaccine (1 of 2) Never done    Pneumococcal 50+ years Vaccine (2 of 2 - PCV) 09/25/2021    COVID-19 Vaccine (1 - 2024-25 season) Never done    Breast cancer screen  06/07/2025    Flu vaccine (Season Ended) 08/01/2025    Depression Screen  03/04/2026    Colorectal Cancer Screen  04/30/2027    Lipids  05/14/2030    Hepatitis A vaccine  Aged Out    Hib vaccine  Aged Out    Polio vaccine  Aged Out    Meningococcal (ACWY) vaccine  Aged Out    Meningococcal B vaccine  Aged Out    Pneumococcal 0-49 years Vaccine  Discontinued    Diabetes screen  Discontinued             (applicable per patient's age: Cancer Screenings, Depression Screening, Fall Risk Screening, Immunizations)    Hemoglobin A1C (%)   Date Value   05/16/2014 5.4     AST (U/L)   Date Value   05/14/2025 18     ALT (U/L)   Date Value   05/14/2025 21     BUN (mg/dL)   Date Value   05/14/2025 11      (goal A1C is < 7)   (goal LDL is <100) need 30-50% reduction from baseline     BP Readings from Last 3 Encounters:   06/03/25 130/84   03/04/25 130/88   08/27/24 130/82    (goal /80)      All Future Testing planned in [facility]:  Lab Frequency Next Occurrence   ELANA DEENA DIGITAL SCREEN BILATERAL Once 06/08/2024       Next Visit Date:  Future Appointments   Date Time Provider Department Center   6/11/2025  9:30 AM Francine Berkowitz MD [facility]   12/9/2025 10:40 AM Hafsa Simons, APRN - CNP [facility]            Patient Active Problem List:     Hypercholesterolemia

## 2025-06-03 NOTE — PATIENT INSTRUCTIONS
THANK YOU FOR TRUSTING US WITH YOUR HEALTHCARE !!    The associates caring for you today were:    Family Practice Provider: Hafsa MATHIS-LES    Clinical Team Nurse: Sunita Brenner LPN    Clinical Team Medical Assistant: Jess Fournier MA    Our goal is to provide you with EXCEPTIONAL patient care, and we strive to do this for EVERY patient, EVERY visit. Since we care about you and your experience, you may receive a patient satisfaction survey from [employer] by postal mail/email/text. We truly welcome your feedback and ask that you complete the survey to let us know how we are doing.    Important Numbers:  [facility] phone [phone]   [facility] Nurse/MA Line: [phone]  Fax  [phone]   Central [phone]  Billing questions: [phone]  Medical Records Request: [phone]    Manage your healthcare  with [facility] My[facility]. To activate your account, visit [email or web address]   DIGITAL scheduling available now through my chart.  Schedule your next appointment at your convenience through your my chart.       [facility] Hours:  Monday: [place] office location 8-5 ([phone]) Offering additional late hours the first Monday of the month until 7 pm.   Tuesday: 8: :  812 Noon, closed  of the month   : 7:30-4:30   SURVEY:    You may be receiving a survey from Tor Hernandez regarding your visit today.    Please complete the survey to enable us to provide the highest quality of care to you and your family.    If you cannot score us a very good on any question, please call the office to discuss how we could have made your experience a very good one.    Thank you.

## 2025-06-03 NOTE — PROGRESS NOTES
HPI Notes    Name: Tita Mary  : 1970         Chief Complaint:     Chief Complaint   Patient presents with    Annual Exam     With breast exam       History of Present Illness:        HPI     Annual physical     54 year old female   children 2 ,   Right handed      Allergies: poison ivy, bee venom, absorable suture.      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       Smoking hx 1/3 ppd age 26 for 15 years pt started again age 43 for 3-4 years then transitioned to vape 5 years ago.   Flavor menthol, 1.8 ml/ 5% nicotine. Vuse menthol. , cartridge lasts 3 days.      Alcohol use - none.      Vaccination took all as child : chicken pox   Shingrix vaccine recommended.   Covid 19 twice , did not choose to take vaccines.         Pt c/o allergies environmental, no hx asthma.   Pt c/o nasal congestion with lots mucous.   Easy bronchitis  Coughing up mucous yellow in color , pressure in sinus,   Discussed singulair , claritin   Smoking cessation     A2 (set twins lost due to premature labor)   Vaginal deliveries, last emergency c section  Did not breast feed  Last pap  Has all reproductive parts, had tubal.   Normal pap. , no postmenopausal bleeding.   Left thigh varicose vein.      SBE done  Mammogram ordered     Psoriasis /eczema- fingers agreeable to low dose steroid , cautioned not to use on face or private area and to use sparingly 2 x week max.      Pt with known cervical disc disease is on lyrica stopped for 1 week and pain,numbness and spasm returned.    on lyrica, OARRS monitored manages well, offered further eval with neurosurgeon pt declined.     CBC abnormal with consistent lymphocytosis has appt with hematology      Latest Reference Range & Units 23 12:37 25 14:30   WBC 10^3/mL 8.4 7.8 (E)   RBC 10^6/µL 5.16 5.08 (E)   Hemoglobin Quant 12.0 - 16.0 g/dL 14.0 13.8 (E)   Hematocrit 36 - 46 % 42.5 42.1 
Never

## 2025-06-06 ASSESSMENT — ENCOUNTER SYMPTOMS
CHEST TIGHTNESS: 0
WHEEZING: 0
EYES NEGATIVE: 1
SHORTNESS OF BREATH: 0
COUGH: 0
SORE THROAT: 0

## 2025-06-11 ENCOUNTER — OFFICE VISIT (OUTPATIENT)
Dept: ONCOLOGY | Age: 55
End: 2025-06-11

## 2025-06-11 ENCOUNTER — HOSPITAL ENCOUNTER (OUTPATIENT)
Age: 55
Discharge: HOME | End: 2025-06-11
Payer: COMMERCIAL

## 2025-06-11 VITALS
RESPIRATION RATE: 18 BRPM | SYSTOLIC BLOOD PRESSURE: 135 MMHG | DIASTOLIC BLOOD PRESSURE: 73 MMHG | TEMPERATURE: 97.6 F | WEIGHT: 293 LBS | HEART RATE: 62 BPM | BODY MASS INDEX: 46.78 KG/M2

## 2025-06-11 DIAGNOSIS — D72.820 LYMPHOCYTOSIS: Primary | ICD-10-CM

## 2025-06-11 DIAGNOSIS — D72.820 LYMPHOCYTOSIS: ICD-10-CM

## 2025-06-11 LAB
ALBUMIN SERPL-MCNC: 4.2 G/DL (ref 3.5–5.2)
ALBUMIN/GLOB SERPL: 1.2 {RATIO} (ref 1–2.5)
ALP SERPL-CCNC: 97 U/L (ref 35–104)
ALT SERPL-CCNC: 22 U/L (ref 10–35)
ANION GAP SERPL CALCULATED.3IONS-SCNC: 10 MMOL/L (ref 9–16)
AST SERPL-CCNC: 18 U/L (ref 10–35)
BASOPHILS # BLD: 0.06 K/UL (ref 0–0.2)
BASOPHILS NFR BLD: 1 % (ref 0–2)
BILIRUB SERPL-MCNC: 0.3 MG/DL (ref 0–1.2)
BUN SERPL-MCNC: 12 MG/DL (ref 6–20)
BUN/CREAT SERPL: 13 (ref 9–20)
CALCIUM SERPL-MCNC: 9.8 MG/DL (ref 8.6–10.4)
CHLORIDE SERPL-SCNC: 106 MMOL/L (ref 98–107)
CO2 SERPL-SCNC: 26 MMOL/L (ref 20–31)
CREAT SERPL-MCNC: 0.9 MG/DL (ref 0.5–0.9)
EOSINOPHIL # BLD: 0.38 K/UL (ref 0–0.44)
EOSINOPHILS RELATIVE PERCENT: 4 % (ref 1–4)
ERYTHROCYTE [DISTWIDTH] IN BLOOD BY AUTOMATED COUNT: 13.4 % (ref 11.8–14.4)
GFR, ESTIMATED: 78 ML/MIN/1.73M2
GLUCOSE SERPL-MCNC: 81 MG/DL (ref 74–99)
HCT VFR BLD AUTO: 45.7 % (ref 36.3–47.1)
HGB BLD-MCNC: 14.7 G/DL (ref 11.9–15.1)
IMM GRANULOCYTES # BLD AUTO: <0.03 K/UL (ref 0–0.3)
IMM GRANULOCYTES NFR BLD: 0 %
LDH SERPL-CCNC: 152 U/L (ref 135–214)
LYMPHOCYTES NFR BLD: 3.56 K/UL (ref 1.1–3.7)
LYMPHOCYTES RELATIVE PERCENT: 42 % (ref 24–43)
MCH RBC QN AUTO: 27.8 PG (ref 25.2–33.5)
MCHC RBC AUTO-ENTMCNC: 32.2 G/DL (ref 28.4–34.8)
MCV RBC AUTO: 86.6 FL (ref 82.6–102.9)
MONOCYTES NFR BLD: 0.69 K/UL (ref 0.1–1.2)
MONOCYTES NFR BLD: 8 % (ref 3–12)
NEUTROPHILS NFR BLD: 45 % (ref 36–65)
NEUTS SEG NFR BLD: 3.85 K/UL (ref 1.5–8.1)
NRBC BLD-RTO: 0 PER 100 WBC
PLATELET # BLD AUTO: 286 K/UL (ref 138–453)
PMV BLD AUTO: 10.9 FL (ref 8.1–13.5)
POTASSIUM SERPL-SCNC: 4.1 MMOL/L (ref 3.7–5.3)
PROT SERPL-MCNC: 7.5 G/DL (ref 6.6–8.7)
RBC # BLD AUTO: 5.28 M/UL (ref 3.95–5.11)
SODIUM SERPL-SCNC: 142 MMOL/L (ref 136–145)
WBC OTHER # BLD: 8.6 K/UL (ref 3.5–11.3)

## 2025-06-11 PROCEDURE — 88184 FLOWCYTOMETRY/ TC 1 MARKER: CPT

## 2025-06-11 PROCEDURE — 85025 COMPLETE CBC W/AUTO DIFF WBC: CPT

## 2025-06-11 PROCEDURE — 80053 COMPREHEN METABOLIC PANEL: CPT

## 2025-06-11 PROCEDURE — 83615 LACTATE (LD) (LDH) ENZYME: CPT

## 2025-06-11 PROCEDURE — 88185 FLOWCYTOMETRY/TC ADD-ON: CPT

## 2025-06-11 PROCEDURE — 36415 COLL VENOUS BLD VENIPUNCTURE: CPT

## 2025-06-11 NOTE — PROGRESS NOTES
DIAGNOSIS:   Lymphocytosis  Longstanding psoriasis  CURRENT THERAPY:  Workup in progress  BRIEF CASE HISTORY:   Tita Mary is a very pleasant 54 y.o. female who is referred to us for persistent lymphocytosis.  The patient has longstanding psoriasis and more recently, routine labs showed that she had persistent lymphocytosis.  Very absolute lymphocyte count never close 5000 but the lymphocytosis was concerning.  Patient has longstanding psoriasis which she is not on any active treatment.  It was noticed that her psoriasis is becoming very active recently and involving the torso upper EXTR and lower extremity as well as her face.  Because of the rising lymphocytosis and the overactive psoriasis, there was a possibility of involving lymphoma so she was referred to us.   She is sent to us for a consultation, she denies any fever, chills night sweats or weight loss.  She is fairly active without any limitation.  Unfortunately that her psoriasis is fairly active as discussed above.      PAST MEDICAL HISTORY: has a past medical history of Psoriasis.    PAST SURGICAL HISTORY: has a past surgical history that includes Tubal ligation (2003);  section (2003); and Abdomen surgery (2003).     CURRENT MEDICATIONS:  has a current medication list which includes the following prescription(s): triamcinolone, pregabalin, and ondansetron.    ALLERGIES:  is allergic to poison ivy extract, bee venom, and other/food.    FAMILY HISTORY: Negative for any hematological or oncological conditions.     SOCIAL HISTORY:  reports that she quit smoking about 7 years ago. Her smoking use included cigarettes. She started smoking about 37 years ago. She has a 7.5 pack-year smoking history. She has never used smokeless tobacco. She reports that she does not drink alcohol and does not use drugs.    REVIEW OF SYSTEMS:   General: no fever or night sweats, Weight is stable.  ENT: No double or blurred vision, no tinnitus or

## 2025-06-12 LAB — SURGICAL PATHOLOGY REPORT: NORMAL

## 2025-06-13 LAB — FLOW CYTOMETRY BL: NORMAL

## 2025-06-17 ENCOUNTER — HOSPITAL ENCOUNTER (OUTPATIENT)
Dept: CT IMAGING | Age: 55
Discharge: HOME | End: 2025-06-19
Attending: INTERNAL MEDICINE
Payer: COMMERCIAL

## 2025-06-17 DIAGNOSIS — D72.820 LYMPHOCYTOSIS: ICD-10-CM

## 2025-06-17 PROCEDURE — 74177 CT ABD & PELVIS W/CONTRAST: CPT

## 2025-06-17 PROCEDURE — 6360000004 HC RX CONTRAST MEDICATION: Performed by: INTERNAL MEDICINE

## 2025-06-17 RX ORDER — IOPAMIDOL 755 MG/ML
100 INJECTION, SOLUTION INTRAVASCULAR
Status: COMPLETED | OUTPATIENT
Start: 2025-06-17 | End: 2025-06-17

## 2025-06-17 RX ADMIN — IOPAMIDOL 100 ML: 755 INJECTION, SOLUTION INTRAVENOUS at 12:50

## 2025-06-25 ENCOUNTER — OFFICE VISIT (OUTPATIENT)
Dept: ONCOLOGY | Age: 55
End: 2025-06-25
Payer: COMMERCIAL

## 2025-06-25 VITALS
RESPIRATION RATE: 18 BRPM | BODY MASS INDEX: 46.35 KG/M2 | HEART RATE: 71 BPM | DIASTOLIC BLOOD PRESSURE: 77 MMHG | WEIGHT: 293 LBS | SYSTOLIC BLOOD PRESSURE: 161 MMHG | TEMPERATURE: 97.9 F

## 2025-06-25 DIAGNOSIS — D72.820 LYMPHOCYTOSIS: Primary | ICD-10-CM

## 2025-06-25 PROCEDURE — 99213 OFFICE O/P EST LOW 20 MIN: CPT | Performed by: INTERNAL MEDICINE

## 2025-06-25 NOTE — PROGRESS NOTES
DIAGNOSIS:   Lymphocytosis  Longstanding psoriasis  CURRENT THERAPY:  Workup in progress  BRIEF CASE HISTORY:   Tita Mary is a very pleasant 54 y.o. female who is referred to us for persistent lymphocytosis.  The patient has longstanding psoriasis and more recently, routine labs showed that she had persistent lymphocytosis.  Very absolute lymphocyte count never close 5000 but the lymphocytosis was concerning.  Patient has longstanding psoriasis which she is not on any active treatment.  It was noticed that her psoriasis is becoming very active recently and involving the torso upper EXTR and lower extremity as well as her face.  Because of the rising lymphocytosis and the overactive psoriasis, there was a possibility of involving lymphoma so she was referred to us.   She is sent to us for a consultation, she denies any fever, chills night sweats or weight loss.  She is fairly active without any limitation.  Unfortunately that her psoriasis is fairly active as discussed above.    INTERIM HISTORY  Patient comes in today to discuss the results of the workup.  Workup was essentially negative for any lymphoproliferative disorder.  No lymphocytosis or lymphadenopathy were detected.  She was reassured  PAST MEDICAL HISTORY: has a past medical history of Psoriasis.    PAST SURGICAL HISTORY: has a past surgical history that includes Tubal ligation (2003);  section (2003); and Abdomen surgery (2003).     CURRENT MEDICATIONS:  has a current medication list which includes the following prescription(s): triamcinolone, ondansetron, and pregabalin.    ALLERGIES:  is allergic to poison ivy extract, bee venom, and other.    FAMILY HISTORY: Negative for any hematological or oncological conditions.     SOCIAL HISTORY:  reports that she quit smoking about 7 years ago. Her smoking use included cigarettes. She started smoking about 37 years ago. She has a 7.5 pack-year smoking history. She has never used

## 2025-07-03 DIAGNOSIS — J01.00 ACUTE MAXILLARY SINUSITIS, RECURRENCE NOT SPECIFIED: ICD-10-CM

## 2025-07-07 RX ORDER — MONTELUKAST SODIUM 10 MG/1
10 TABLET ORAL DAILY PRN
Qty: 90 TABLET | Refills: 1 | OUTPATIENT
Start: 2025-07-07

## 2025-07-23 DIAGNOSIS — J01.00 ACUTE MAXILLARY SINUSITIS, RECURRENCE NOT SPECIFIED: ICD-10-CM

## 2025-07-24 RX ORDER — MONTELUKAST SODIUM 10 MG/1
10 TABLET ORAL DAILY PRN
Qty: 90 TABLET | Refills: 1 | Status: SHIPPED | OUTPATIENT
Start: 2025-07-24

## 2025-08-07 DIAGNOSIS — M62.838 MUSCLE SPASMS OF NECK: ICD-10-CM

## 2025-08-07 DIAGNOSIS — M50.123 CERVICAL DISC DISORDER AT C6-C7 LEVEL WITH RADICULOPATHY: ICD-10-CM

## 2025-08-08 RX ORDER — PREGABALIN 25 MG/1
25 CAPSULE ORAL 2 TIMES DAILY
Qty: 60 CAPSULE | Refills: 2 | Status: SHIPPED | OUTPATIENT
Start: 2025-08-08 | End: 2025-11-06